# Patient Record
Sex: MALE | Race: WHITE | NOT HISPANIC OR LATINO | Employment: FULL TIME | URBAN - METROPOLITAN AREA
[De-identification: names, ages, dates, MRNs, and addresses within clinical notes are randomized per-mention and may not be internally consistent; named-entity substitution may affect disease eponyms.]

---

## 2018-09-24 ENCOUNTER — APPOINTMENT (OUTPATIENT)
Dept: URGENT CARE | Facility: CLINIC | Age: 28
End: 2018-09-24

## 2018-09-24 DIAGNOSIS — Z02.1 PRE-EMPLOYMENT HEALTH SCREENING EXAMINATION: Primary | ICD-10-CM

## 2018-09-24 PROCEDURE — 86787 VARICELLA-ZOSTER ANTIBODY: CPT | Performed by: PHYSICIAN ASSISTANT

## 2018-09-24 PROCEDURE — 86735 MUMPS ANTIBODY: CPT | Performed by: PHYSICIAN ASSISTANT

## 2018-09-24 PROCEDURE — 86765 RUBEOLA ANTIBODY: CPT | Performed by: PHYSICIAN ASSISTANT

## 2018-09-24 PROCEDURE — 86706 HEP B SURFACE ANTIBODY: CPT | Performed by: PHYSICIAN ASSISTANT

## 2018-09-24 PROCEDURE — 86480 TB TEST CELL IMMUN MEASURE: CPT | Performed by: PHYSICIAN ASSISTANT

## 2018-09-24 PROCEDURE — 86762 RUBELLA ANTIBODY: CPT | Performed by: PHYSICIAN ASSISTANT

## 2018-09-25 LAB
HBV SURFACE AB SER-ACNC: >1000 MIU/ML
MEV IGG SER QL: NORMAL
MUV IGG SER QL: NORMAL
VZV IGG SER IA-ACNC: NORMAL

## 2018-09-26 LAB
GAMMA INTERFERON BACKGROUND BLD IA-ACNC: 0.07 IU/ML
M TB IFN-G BLD-IMP: NEGATIVE
M TB IFN-G CD4+ BCKGRND COR BLD-ACNC: -0.03 IU/ML
M TB IFN-G CD4+ BCKGRND COR BLD-ACNC: 0.03 IU/ML
MITOGEN IGNF BCKGRD COR BLD-ACNC: >10 IU/ML

## 2018-09-28 LAB — RUBV IGG SERPL IA-ACNC: 123.3 IU/ML

## 2019-03-29 LAB
EXTERNAL HIV CONFIRMATION: NORMAL
EXTERNAL HIV SCREEN: NORMAL

## 2020-08-20 ENCOUNTER — OFFICE VISIT (OUTPATIENT)
Dept: FAMILY MEDICINE CLINIC | Facility: CLINIC | Age: 30
End: 2020-08-20
Payer: COMMERCIAL

## 2020-08-20 VITALS
OXYGEN SATURATION: 98 % | TEMPERATURE: 97.7 F | DIASTOLIC BLOOD PRESSURE: 66 MMHG | SYSTOLIC BLOOD PRESSURE: 110 MMHG | HEART RATE: 84 BPM | RESPIRATION RATE: 16 BRPM | BODY MASS INDEX: 27.78 KG/M2 | WEIGHT: 187.6 LBS | HEIGHT: 69 IN

## 2020-08-20 DIAGNOSIS — R10.9 ABDOMINAL DISCOMFORT: ICD-10-CM

## 2020-08-20 DIAGNOSIS — Z91.030 BEE ALLERGY STATUS: Primary | ICD-10-CM

## 2020-08-20 DIAGNOSIS — Z11.59 SPECIAL SCREENING EXAMINATION FOR UNSPECIFIED VIRAL DISEASE: ICD-10-CM

## 2020-08-20 PROCEDURE — 99204 OFFICE O/P NEW MOD 45 MIN: CPT | Performed by: NURSE PRACTITIONER

## 2020-08-20 PROCEDURE — 1036F TOBACCO NON-USER: CPT | Performed by: NURSE PRACTITIONER

## 2020-08-20 PROCEDURE — 3008F BODY MASS INDEX DOCD: CPT | Performed by: NURSE PRACTITIONER

## 2020-08-20 RX ORDER — DICYCLOMINE HYDROCHLORIDE 10 MG/1
10 CAPSULE ORAL
Qty: 30 CAPSULE | Refills: 0 | Status: SHIPPED | OUTPATIENT
Start: 2020-08-20 | End: 2020-09-10 | Stop reason: SDUPTHER

## 2020-08-20 RX ORDER — EPINEPHRINE 0.3 MG/.3ML
0.3 INJECTION SUBCUTANEOUS ONCE
Qty: 0.6 ML | Refills: 0 | Status: SHIPPED | OUTPATIENT
Start: 2020-08-20 | End: 2020-09-10 | Stop reason: SDUPTHER

## 2020-08-20 NOTE — ASSESSMENT & PLAN NOTE
Will obtain celiac disease panel and milk allergy panel  Advised to begin abdominal pain log to identify triggers  Discussed that symptoms sound consistent with IBS  Counseled on the fodmap diet  Advised to begin Bentyl 10 mg 3 times a day  Follow-up in 4 weeks or sooner if needed

## 2020-08-20 NOTE — PROGRESS NOTES
Assessment/Plan:    Bee allergy status  Provided EpiPen in the event a bee sting occurs  Abdominal discomfort  Will obtain celiac disease panel and milk allergy panel  Advised to begin abdominal pain log to identify triggers  Discussed that symptoms sound consistent with IBS  Counseled on the fodmap diet  Advised to begin Bentyl 10 mg 3 times a day  Follow-up in 4 weeks or sooner if needed  Diagnoses and all orders for this visit:    Bee allergy status  -     EPINEPHrine (EpiPen 2-Leighton) 0 3 mg/0 3 mL SOAJ; Inject 0 3 mL (0 3 mg total) into a muscle once for 1 dose    Abdominal discomfort  -     Allergy, Milk Components Panel; Future  -     Celiac Disease Panel; Future  -     dicyclomine (BENTYL) 10 mg capsule; Take 1 capsule (10 mg total) by mouth 3 (three) times a day before meals    BMI 27 0-27 9,adult  -     CBC and differential; Future  -     Comprehensive metabolic panel; Future  -     Lipid Panel with Direct LDL reflex; Future  -     TSH, 3rd generation with Free T4 reflex; Future    Special screening examination for unspecified viral disease  -     SARS-CoV2 Antibody, Total (IgG, IgA, IgM) [facility]; Future          Subjective:      Patient ID: Brando Coreas is a 26 y o  male  Rosalinda Sparks presents for an initial visit to establish care  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  He has a history an anaphylactic reaction of bee stings  Denies any additional significant past medical history  Denies any surgical history  Rosalinda Sparks reports developing left upper quadrant abdominal pain he describes as a burning  It has been presence the past 3 years  It is intermittent  He treated his symptoms with omeprazole without improvement  Having a bowel movement improves his symptoms  He will also experience loose stools several times a week  The main trigger is stress  He will experience abdominal cramping and his symptoms will improve after having a bowel movement  Denies painful swallowing, weight loss, nausea, vomiting, or blood in stools  He has no family history of inflammatory bowel disease  Requesting to be tested for COVID antibodies as his wife is currently pregnant and he worked in the hospital setting  The following portions of the patient's history were reviewed and updated as appropriate:   He   Patient Active Problem List    Diagnosis Date Noted    Bee allergy status 08/20/2020    Abdominal discomfort 08/20/2020    BMI 27 0-27 9,adult 08/20/2020    Special screening examination for unspecified viral disease 08/20/2020     Current Outpatient Medications   Medication Sig Dispense Refill    dicyclomine (BENTYL) 10 mg capsule Take 1 capsule (10 mg total) by mouth 3 (three) times a day before meals 30 capsule 0    EPINEPHrine (EpiPen 2-Leighton) 0 3 mg/0 3 mL SOAJ Inject 0 3 mL (0 3 mg total) into a muscle once for 1 dose 0 6 mL 0     No current facility-administered medications for this visit  He is allergic to bee venom       Review of Systems   Constitutional: Negative  HENT: Negative  Eyes: Negative  Respiratory: Negative  Cardiovascular: Negative  Gastrointestinal: Positive for abdominal pain (LUQ), constipation (occasional) and diarrhea  Negative for abdominal distention, anal bleeding, blood in stool, nausea and vomiting  Genitourinary: Negative  Musculoskeletal: Negative  Skin: Negative  Allergic/Immunologic: Negative  Neurological: Negative  Psychiatric/Behavioral: Negative  /66   Pulse 84   Temp 97 7 °F (36 5 °C) (Tympanic)   Resp 16   Ht 5' 9" (1 753 m)   Wt 85 1 kg (187 lb 9 6 oz)   SpO2 98%   BMI 27 70 kg/m²     Objective:     Physical Exam  Vitals signs and nursing note reviewed  Constitutional:       General: He is not in acute distress  Appearance: Normal appearance  He is well-developed  He is not ill-appearing, toxic-appearing or diaphoretic     HENT:      Head: Normocephalic and atraumatic  Eyes:      Conjunctiva/sclera: Conjunctivae normal    Neck:      Musculoskeletal: Neck supple  Cardiovascular:      Rate and Rhythm: Normal rate and regular rhythm  Heart sounds: Normal heart sounds  No murmur  Pulmonary:      Effort: Pulmonary effort is normal  No respiratory distress  Breath sounds: Normal breath sounds  No wheezing or rales  Chest:      Chest wall: No tenderness  Abdominal:      General: Abdomen is flat  Bowel sounds are normal  There is no distension  Palpations: Abdomen is soft  There is no mass  Tenderness: There is no abdominal tenderness  There is no guarding or rebound  Hernia: No hernia is present  Neurological:      Mental Status: He is alert and oriented to person, place, and time  Psychiatric:         Mood and Affect: Mood normal          Behavior: Behavior normal          Thought Content: Thought content normal          Judgment: Judgment normal          BMI Counseling: Body mass index is 27 7 kg/m²  The BMI is above normal  Nutrition recommendations include 3-5 servings of fruits/vegetables daily, reducing fast food intake, consuming healthier snacks, moderation in carbohydrate intake and increasing intake of lean protein  Exercise recommendations include exercising 3-5 times per week

## 2020-08-20 NOTE — PATIENT INSTRUCTIONS
Weight Management   AMBULATORY CARE:   Why it is important to manage your weight:  Being overweight increases your risk of health conditions such as heart disease, high blood pressure, type 2 diabetes, and certain types of cancer  It can also increase your risk for osteoarthritis, sleep apnea, and other respiratory problems  Aim for a slow, steady weight loss  Even a small amount of weight loss can lower your risk of health problems  How to lose weight safely:  A safe and healthy way to lose weight is to eat fewer calories and get regular exercise  You can lose up about 1 pound a week by decreasing the number of calories you eat by 500 calories each day  You can decrease calories by eating smaller portion sizes or by cutting out high-calorie foods  Read labels to find out how many calories are in the foods you eat  You can also burn calories with exercise such as walking, swimming, or biking  You will be more likely to keep weight off if you make these changes part of your lifestyle  Healthy meal plan for weight management:  A healthy meal plan includes a variety of foods, contains fewer calories, and helps you stay healthy  A healthy meal plan includes the following:  · Eat whole-grain foods more often  A healthy meal plan should contain fiber  Fiber is the part of grains, fruits, and vegetables that is not broken down by your body  Whole-grain foods are healthy and provide extra fiber in your diet  Some examples of whole-grain foods are whole-wheat breads and pastas, oatmeal, brown rice, and bulgur  · Eat a variety of vegetables every day  Include dark, leafy greens such as spinach, kale, yolande greens, and mustard greens  Eat yellow and orange vegetables such as carrots, sweet potatoes, and winter squash  · Eat a variety of fruits every day  Choose fresh or canned fruit (canned in its own juice or light syrup) instead of juice  Fruit juice has very little or no fiber  · Eat low-fat dairy foods  Drink fat-free (skim) milk or 1% milk  Eat fat-free yogurt and low-fat cottage cheese  Try low-fat cheeses such as mozzarella and other reduced-fat cheeses  · Choose meat and other protein foods that are low in fat  Choose beans or other legumes such as split peas or lentils  Choose fish, skinless poultry (chicken or turkey), or lean cuts of red meat (beef or pork)  Before you cook meat or poultry, cut off any visible fat  · Use less fat and oil  Try baking foods instead of frying them  Add less fat, such as margarine, sour cream, regular salad dressing and mayonnaise to foods  Eat fewer high-fat foods  Some examples of high-fat foods include french fries, doughnuts, ice cream, and cakes  · Eat fewer sweets  Limit foods and drinks that are high in sugar  This includes candy, cookies, regular soda, and sweetened drinks  Ways to decrease calories:   · Eat smaller portions  ¨ Use a small plate with smaller servings  ¨ Do not eat second helpings  ¨ When you eat at a restaurant, ask for a box and place half of your meal in the box before you eat  ¨ Share an entrée with someone else  · Replace high-calorie snacks with healthy, low-calorie snacks  ¨ Choose fresh fruit, vegetables, fat-free rice cakes, or air-popped popcorn instead of potato chips, nuts, or chocolate  ¨ Choose water or calorie-free drinks instead of soda or sweetened drinks  · Eat regular meals  Skipping meals can lead to overeating later in the day  Eat a healthy snack in place of a meal if you do not have time to eat a regular meal      · Do not shop for groceries when you are hungry  You may be more likely to make unhealthy food choices  Take a grocery list of healthy foods and shop after you have eaten  Exercise:  Exercise at least 30 minutes per day on most days of the week  Some examples of exercise include walking, biking, dancing, and swimming   You can also fit in more physical activity by taking the stairs instead of the elevator or parking farther away from stores  Ask your healthcare provider about the best exercise plan for you  Other things to consider as you try to lose weight:   · Be aware of situations that may give you the urge to overeat, such as eating while watching television  Find ways to avoid these situations  For example, read a book, go for a walk, or do crafts  · Meet with a weight loss support group or friends who are also trying to lose weight  This may help you stay motivated to continue working on your weight loss goals  © 2017 [employer] [employer] Information is for End User's use only and may not be sold, redistributed or otherwise used for commercial purposes  All illustrations and images included in CareNotes® are the copyrighted property of [employer]  or Alen Sosa  The above information is an  only  It is not intended as medical advice for individual conditions or treatments  Talk to your doctor, nurse or pharmacist before following any medical regimen to see if it is safe and effective for you  Heart Healthy Diet   AMBULATORY CARE:   A heart healthy diet  is an eating plan low in total fat, unhealthy fats, and sodium (salt)  A heart healthy diet helps decrease your risk for heart disease and stroke  Limit the amount of fat you eat to 25% to 35% of your total daily calories  Limit sodium to less than 2,300 mg each day  Healthy fats:  Healthy fats can help improve cholesterol levels  The risk for heart disease is decreased when cholesterol levels are normal  Choose healthy fats, such as the following:  · Unsaturated fat  is found in foods such as soybean, canola, olive, corn, and safflower oils  It is also found in soft tub margarine that is made with liquid vegetable oil  · Omega-3 fat  is found in certain fish, such as salmon, tuna, and trout, and in walnuts and flaxseed    Unhealthy fats:  Unhealthy fats can cause unhealthy cholesterol levels in your blood and increase your risk of heart disease  Limit unhealthy fats, such as the following:  · Cholesterol  is found in animal foods, such as eggs and lobster, and in dairy products made from whole milk  Limit cholesterol to less than 300 milligrams (mg) each day  You may need to limit cholesterol to 200 mg each day if you have heart disease  · Saturated fat  is found in meats, such as layne and hamburger  It is also found in chicken or turkey skin, whole milk, and butter  Limit saturated fat to less than 7% of your total daily calories  Limit saturated fat to less than 6% if you have heart disease or are at increased risk for it  · Trans fat  is found in packaged foods, such as potato chips and cookies  It is also in hard margarine, some fried foods, and shortening  Avoid trans fats as much as possible    Heart healthy foods and drinks to include:  Ask your dietitian or healthcare provider how many servings to have from each of the following food groups:  · Grains:      ¨ Whole-wheat breads, cereals, and pastas, and brown rice    ¨ Low-fat, low-sodium crackers and chips    · Vegetables:      ¨ Broccoli, green beans, green peas, and spinach    ¨ Collards, kale, and lima beans    ¨ Carrots, sweet potatoes, tomatoes, and peppers    ¨ Canned vegetables with no salt added    · Fruits:      ¨ Bananas, peaches, pears, and pineapple    ¨ Grapes, raisins, and dates    ¨ Oranges, tangerines, grapefruit, orange juice, and grapefruit juice    ¨ Apricots, mangoes, melons, and papaya    ¨ Raspberries and strawberries    ¨ Canned fruit with no added sugar    · Low-fat dairy products:      ¨ Nonfat (skim) milk, 1% milk, and low-fat almond, cashew, or soy milks fortified with calcium    ¨ Low-fat cheese, regular or frozen yogurt, and cottage cheese    · Meats and proteins , such as lean cuts of beef and pork (loin, leg, round), skinless chicken and turkey, legumes, soy products, egg whites, and nuts  Foods and drinks to limit or avoid:  Ask your dietitian or healthcare provider about these and other foods that are high in unhealthy fat, sodium, and sugar:  · Snack or packaged foods , such as frozen dinners, cookies, macaroni and cheese, and cereals with more than 300 mg of sodium per serving    · Canned or dry mixes  for cakes, soups, sauces, or gravies    · Vegetables with added sodium , such as instant potatoes, vegetables with added sauces, or regular canned vegetables    · Other foods high in sodium , such as ketchup, barbecue sauce, salad dressing, pickles, olives, soy sauce, and miso    · High-fat dairy foods  such as whole or 2% milk, cream cheese, or sour cream, and cheeses     · High-fat protein foods  such as high-fat cuts of beef (T-bone steaks, ribs), chicken or turkey with skin, and organ meats, such as liver    · Cured or smoked meats , such as hot dogs, layne, and sausage    · Unhealthy fats and oils , such as butter, stick margarine, shortening, and cooking oils such as coconut or palm oil    · Food and drinks high in sugar , such as soft drinks (soda), sports drinks, sweetened tea, candy, cake, cookies, pies, and doughnuts  Other diet guidelines to follow:   · Eat more foods containing omega-3 fats  Eat fish high in omega-3 fats at least 2 times a week  · Limit alcohol  Too much alcohol can damage your heart and raise your blood pressure  Women should limit alcohol to 1 drink a day  Men should limit alcohol to 2 drinks a day  A drink of alcohol is 12 ounces of beer, 5 ounces of wine, or 1½ ounces of liquor  · Choose low-sodium foods  High-sodium foods can lead to high blood pressure  Add little or no salt to food you prepare  Use herbs and spices in place of salt  · Eat more fiber  to help lower cholesterol levels  Eat at least 5 servings of fruits and vegetables each day  Eat 3 ounces of whole-grain foods each day  Legumes (beans) are also a good source of fiber    Lifestyle guidelines: · Do not smoke  Nicotine and other chemicals in cigarettes and cigars can cause lung and heart damage  Ask your healthcare provider for information if you currently smoke and need help to quit  E-cigarettes or smokeless tobacco still contain nicotine  Talk to your healthcare provider before you use these products  · Exercise regularly  to help you maintain a healthy weight and improve your blood pressure and cholesterol levels  Ask your healthcare provider about the best exercise plan for you  Do not start an exercise program without asking your healthcare provider  Follow up with your healthcare provider as directed:  Write down your questions so you remember to ask them during your visits  © 2017 [employer] Information is for End User's use only and may not be sold, redistributed or otherwise used for commercial purposes  All illustrations and images included in CareNotes® are the copyrighted property of [employer]  or [employer]osa  The above information is an  only  It is not intended as medical advice for individual conditions or treatments  Talk to your doctor, nurse or pharmacist before following any medical regimen to see if it is safe and effective for you  Calorie Counting Diet   WHAT YOU NEED TO KNOW:   What is a calorie counting diet? It is a meal plan based on counting calories each day to reach a healthy body weight  You will need to eat fewer calories if you are trying to lose weight  Weight loss may decrease your risk for certain health problems or improve your health if you have health problems  Some of these health problems include heart disease, high blood pressure, and diabetes  What foods should I avoid? Your dietitian will tell you if you need to avoid certain foods based on your body weight and health condition  You may need to avoid high-fat foods if you are at risk for or have heart disease   You may need to eat fewer foods from the breads and starches food group if you have diabetes  How many calories are in foods? The following is a list of foods and drinks with the approximate number of calories in each  Check the food label to find the exact number of calories  A dietitian can tell you how many calories you should have from each food group each day    · Carbohydrate:      ¨ ½ of a 3-inch bagel, 1 slice of bread, or ½ of a hamburger bun or hot dog bun (80)    ¨ 1 (8-inch) flour tortilla or ½ cup of cooked rice (100)    ¨ 1 (6-inch) corn tortilla (80)    ¨ 1 (6-inch) pancake or 1 cup of bran flakes cereal (110)    ¨ ½ cup of cooked cereal (80)    ¨ ½ cup of cooked pasta (85)    ¨ 1 ounce of pretzels (100)    ¨ 3 cups of air-popped popcorn without butter or oil (80)    · Dairy:      ¨ 1 cup of skim or 1% milk (90)    ¨ 1 cup of 2% milk (120)    ¨ 1 cup of whole milk (160)    ¨ 1 cup of 2% chocolate milk (220)    ¨ 1 ounce of low-fat cheese with 3 grams of fat per ounce (70)    ¨ 1 ounce of cheddar cheese (114)    ¨ ½ cup of 1% fat cottage cheese (80)    ¨ 1 cup of plain or sugar-free, fat-free yogurt (90)    · Protein foods:      ¨ 3 ounces of fish (not breaded or fried) (95)    ¨ 3 ounces of breaded, fried fish (195)    ¨ ¾ cup of tuna canned in water (105)    ¨ 3 ounces of chicken breast without skin (105)    ¨ 1 fried chicken breast with skin (350)    ¨ ¼ cup of fat free egg substitute (40)    ¨ 1 large egg (75)    ¨ 3 ounces of lean beef or pork (165)    ¨ 3 ounces of fried pork chop or ham (185)    ¨ ½ cup of cooked dried beans, such as kidney, maher, lentils, or navy (115)    ¨ 3 ounces of bologna or lunch meat (225)    ¨ 2 links of breakfast sausage (140)    · Vegetables:      ¨ ½ cup of sliced mushrooms (10)    ¨ 1 cup of salad greens, such as lettuce, spinach, or cathryn (15)    ¨ ½ cup of steamed asparagus (20)    ¨ ½ cup of cooked summer squash, zucchini squash, or green or wax beans (25)    ¨ 1 cup of broccoli or cauliflower florets, or 1 medium tomato (25)    ¨ 1 large raw carrot or ½ cup of cooked carrots (40)    ¨ ? of a medium cucumber or 1 stalk of celery (5)    ¨ 1 small baked potato (160)    ¨ 1 cup of breaded, fried vegetables (230)    · Fruit:      ¨ 1 (6-inch) banana (55)     ¨ ½ of a 4-inch grapefruit (55)    ¨ 15 grapes (60)    ¨ 1 medium orange or apple (70)    ¨ 1 large peach (65)    ¨ 1 cup of fresh pineapple chunks (75)    ¨ 1 cup of melon cubes (50)    ¨ 1¼ cups of whole strawberries (45)    ¨ ½ cup of fruit canned in juice (55)    ¨ ½ cup of fruit canned in heavy syrup (110)    ¨ ?  cup of raisins (130)    ¨ ½ cup of unsweetened fruit juice (60)    ¨ ½ cup of grape, cranberry, or prune juice (90)    · Fat:      ¨ 10 peanuts or 2 teaspoons of peanut butter (55)    ¨ 2 tablespoons of avocado or 1 tablespoon of regular salad dressing (45)    ¨ 2 slices of layne (90)    ¨ 1 teaspoon of oil, such as safflower, canola, corn, or olive oil (45)    ¨ 2 teaspoons of low-fat margarine, or 1 tablespoon of low-fat mayonnaise (50)    ¨ 1 teaspoon of regular margarine (40)    ¨ 1 tablespoon of regular mayonnaise (135)    ¨ 1 tablespoon of cream cheese or 2 tablespoons of low-fat cream cheese (45)    ¨ 2 tablespoons of vegetable shortening (215)    · Dessert and sweets:      ¨ 8 animal crackers or 5 vanilla wafers (80)    ¨ 1 frozen fruit juice bar (80)    ¨ ½ cup of ice milk or low-fat frozen yogurt (90)    ¨ ½ cup of sherbet or sorbet (125)    ¨ ½ cup of sugar-free pudding or custard (60)    ¨ ½ cup of ice cream (140)    ¨ ½ cup of pudding or custard (175)    ¨ 1 (2-inch) square chocolate brownie (185)    · Combination foods:      ¨ Bean burrito made with an 8-inch tortilla, without cheese (275)    ¨ Chicken breast sandwich with lettuce and tomato (325)    ¨ 1 cup of chicken noodle soup (60)    ¨ 1 beef taco (175)    ¨ Regular hamburger with lettuce and tomato (310)    ¨ Regular cheeseburger with lettuce and tomato (410)     ¨ ¼ of a 12-inch cheese pizza (280)    ¨ Fried fish sandwich with lettuce and tomato (425)    ¨ Hot dog and bun (275)    ¨ 1½ cups of macaroni and cheese (310)    ¨ Taco salad with a fried tortilla shell (870)    · Low-calorie foods:      ¨ 1 tablespoon of ketchup or 1 tablespoon of fat free sour cream (15)    ¨ 1 teaspoon of mustard (5)    ¨ ¼ cup of salsa (20)    ¨ 1 large dill pickle (15)    ¨ 1 tablespoon of fat free salad dressing (10)    ¨ 2 teaspoons of low-sugar, light jam or jelly, or 1 tablespoon of sugar-free syrup (15)    ¨ 1 sugar-free popsicle (15)    ¨ 1 cup of club soda, seltzer water, or diet soda (0)  CARE AGREEMENT:   You have the right to help plan your care  Discuss treatment options with your caregivers to decide what care you want to receive  You always have the right to refuse treatment  The above information is an  only  It is not intended as medical advice for individual conditions or treatments  Talk to your doctor, nurse or pharmacist before following any medical regimen to see if it is safe and effective for you  © 2017 [employer] [employer] Information is for End User's use only and may not be sold, redistributed or otherwise used for commercial purposes  All illustrations and images included in CareNotes® are the copyrighted property of [employer]  or [employer]

## 2020-08-24 ENCOUNTER — APPOINTMENT (OUTPATIENT)
Dept: LAB | Facility: HOSPITAL | Age: 30
End: 2020-08-24
Payer: COMMERCIAL

## 2020-08-24 DIAGNOSIS — Z11.59 SPECIAL SCREENING EXAMINATION FOR UNSPECIFIED VIRAL DISEASE: ICD-10-CM

## 2020-08-24 DIAGNOSIS — R10.9 ABDOMINAL DISCOMFORT: ICD-10-CM

## 2020-08-24 LAB
ALBUMIN SERPL BCP-MCNC: 4.1 G/DL (ref 3.5–5)
ALP SERPL-CCNC: 53 U/L (ref 46–116)
ALT SERPL W P-5'-P-CCNC: 55 U/L (ref 12–78)
ANION GAP SERPL CALCULATED.3IONS-SCNC: 6 MMOL/L (ref 4–13)
AST SERPL W P-5'-P-CCNC: 30 U/L (ref 5–45)
BASOPHILS # BLD AUTO: 0.03 THOUSANDS/ΜL (ref 0–0.1)
BASOPHILS NFR BLD AUTO: 1 % (ref 0–1)
BILIRUB SERPL-MCNC: 1.1 MG/DL (ref 0.2–1)
BUN SERPL-MCNC: 19 MG/DL (ref 5–25)
CALCIUM SERPL-MCNC: 9.2 MG/DL (ref 8.3–10.1)
CHLORIDE SERPL-SCNC: 103 MMOL/L (ref 100–108)
CHOLEST SERPL-MCNC: 253 MG/DL (ref 50–200)
CO2 SERPL-SCNC: 31 MMOL/L (ref 21–32)
CREAT SERPL-MCNC: 1.04 MG/DL (ref 0.6–1.3)
EOSINOPHIL # BLD AUTO: 0.07 THOUSAND/ΜL (ref 0–0.61)
EOSINOPHIL NFR BLD AUTO: 2 % (ref 0–6)
ERYTHROCYTE [DISTWIDTH] IN BLOOD BY AUTOMATED COUNT: 11.7 % (ref 11.6–15.1)
GFR SERPL CREATININE-BSD FRML MDRD: 95 ML/MIN/1.73SQ M
GLUCOSE P FAST SERPL-MCNC: 98 MG/DL (ref 65–99)
HCT VFR BLD AUTO: 46.1 % (ref 36.5–49.3)
HDLC SERPL-MCNC: 61 MG/DL
HGB BLD-MCNC: 15.5 G/DL (ref 12–17)
IMM GRANULOCYTES # BLD AUTO: 0.01 THOUSAND/UL (ref 0–0.2)
IMM GRANULOCYTES NFR BLD AUTO: 0 % (ref 0–2)
LDLC SERPL CALC-MCNC: 175 MG/DL (ref 0–100)
LYMPHOCYTES # BLD AUTO: 1.41 THOUSANDS/ΜL (ref 0.6–4.47)
LYMPHOCYTES NFR BLD AUTO: 34 % (ref 14–44)
MCH RBC QN AUTO: 30.6 PG (ref 26.8–34.3)
MCHC RBC AUTO-ENTMCNC: 33.6 G/DL (ref 31.4–37.4)
MCV RBC AUTO: 91 FL (ref 82–98)
MONOCYTES # BLD AUTO: 0.28 THOUSAND/ΜL (ref 0.17–1.22)
MONOCYTES NFR BLD AUTO: 7 % (ref 4–12)
NEUTROPHILS # BLD AUTO: 2.37 THOUSANDS/ΜL (ref 1.85–7.62)
NEUTS SEG NFR BLD AUTO: 56 % (ref 43–75)
NRBC BLD AUTO-RTO: 0 /100 WBCS
PLATELET # BLD AUTO: 241 THOUSANDS/UL (ref 149–390)
PMV BLD AUTO: 10.8 FL (ref 8.9–12.7)
POTASSIUM SERPL-SCNC: 4.4 MMOL/L (ref 3.5–5.3)
PROT SERPL-MCNC: 8.1 G/DL (ref 6.4–8.2)
RBC # BLD AUTO: 5.06 MILLION/UL (ref 3.88–5.62)
SARS-COV-2 IGG+IGM SERPL QL IA: NORMAL
SODIUM SERPL-SCNC: 140 MMOL/L (ref 136–145)
TRIGL SERPL-MCNC: 86 MG/DL
TSH SERPL DL<=0.05 MIU/L-ACNC: 2.46 UIU/ML (ref 0.36–3.74)
WBC # BLD AUTO: 4.17 THOUSAND/UL (ref 4.31–10.16)

## 2020-08-24 PROCEDURE — 82784 ASSAY IGA/IGD/IGG/IGM EACH: CPT

## 2020-08-24 PROCEDURE — 86255 FLUORESCENT ANTIBODY SCREEN: CPT

## 2020-08-24 PROCEDURE — 85025 COMPLETE CBC W/AUTO DIFF WBC: CPT

## 2020-08-24 PROCEDURE — 84443 ASSAY THYROID STIM HORMONE: CPT

## 2020-08-24 PROCEDURE — 83516 IMMUNOASSAY NONANTIBODY: CPT

## 2020-08-24 PROCEDURE — 80061 LIPID PANEL: CPT

## 2020-08-24 PROCEDURE — 36415 COLL VENOUS BLD VENIPUNCTURE: CPT

## 2020-08-24 PROCEDURE — 86769 SARS-COV-2 COVID-19 ANTIBODY: CPT

## 2020-08-24 PROCEDURE — 86008 ALLG SPEC IGE RECOMB EA: CPT

## 2020-08-24 PROCEDURE — 80053 COMPREHEN METABOLIC PANEL: CPT

## 2020-08-25 LAB
A-LACTALB IGE QN: <0.1 KAU/I
B-LACTOGLOB IGE QN: <0.1 KAU/I
CASEIN IGE QN: <0.1 KAU/I
ENDOMYSIUM IGA SER QL: NEGATIVE
GLIADIN PEPTIDE IGA SER-ACNC: 11 UNITS (ref 0–19)
GLIADIN PEPTIDE IGG SER-ACNC: 47 UNITS (ref 0–19)
IGA SERPL-MCNC: 245 MG/DL (ref 90–386)
TTG IGA SER-ACNC: 2 U/ML (ref 0–3)
TTG IGG SER-ACNC: 6 U/ML (ref 0–5)

## 2020-08-27 ENCOUNTER — TELEPHONE (OUTPATIENT)
Dept: FAMILY MEDICINE CLINIC | Facility: CLINIC | Age: 30
End: 2020-08-27

## 2020-08-27 DIAGNOSIS — R89.4 ABNORMAL CELIAC ANTIBODY PANEL: ICD-10-CM

## 2020-08-27 DIAGNOSIS — R10.9 ABDOMINAL DISCOMFORT: Primary | ICD-10-CM

## 2020-08-27 NOTE — TELEPHONE ENCOUNTER
----- Message from Feli Franko, Ramya Nitza Johnson sent at 8/27/2020 11:22 AM EDT -----  Please call patient to review labs and to make aware that his cholesterol was elevated  Please  on daily physical activity, beginning a heart healthy diet, avoidance of fried/process food, and increasing dietary fiber  We will repeat his cholesterol in 3-6 months  His COVID antibody testing was negative, no immunity  His celiac antibody panel was weakly positive  Given his symptoms, I would like to refer him to GI for further evaluation  I will place an order in his chart

## 2020-09-10 DIAGNOSIS — R10.9 ABDOMINAL DISCOMFORT: ICD-10-CM

## 2020-09-10 DIAGNOSIS — Z91.030 BEE ALLERGY STATUS: ICD-10-CM

## 2020-09-10 PROBLEM — E78.5 HYPERLIPIDEMIA: Status: ACTIVE | Noted: 2020-09-10

## 2020-09-10 RX ORDER — EPINEPHRINE 0.3 MG/.3ML
0.3 INJECTION SUBCUTANEOUS ONCE
Qty: 0.6 ML | Refills: 0 | Status: SHIPPED | OUTPATIENT
Start: 2020-09-10 | End: 2020-09-10

## 2020-09-10 RX ORDER — DICYCLOMINE HYDROCHLORIDE 10 MG/1
10 CAPSULE ORAL
Qty: 30 CAPSULE | Refills: 0 | Status: SHIPPED | OUTPATIENT
Start: 2020-09-10 | End: 2021-11-15 | Stop reason: ALTCHOICE

## 2020-09-15 ENCOUNTER — CONSULT (OUTPATIENT)
Dept: GASTROENTEROLOGY | Facility: CLINIC | Age: 30
End: 2020-09-15
Payer: COMMERCIAL

## 2020-09-15 VITALS
BODY MASS INDEX: 27.99 KG/M2 | SYSTOLIC BLOOD PRESSURE: 120 MMHG | HEIGHT: 69 IN | WEIGHT: 189 LBS | RESPIRATION RATE: 16 BRPM | DIASTOLIC BLOOD PRESSURE: 80 MMHG | HEART RATE: 100 BPM

## 2020-09-15 DIAGNOSIS — R14.0 BLOATING: ICD-10-CM

## 2020-09-15 DIAGNOSIS — R10.12 LEFT UPPER QUADRANT ABDOMINAL PAIN: ICD-10-CM

## 2020-09-15 DIAGNOSIS — R10.9 ABDOMINAL DISCOMFORT: ICD-10-CM

## 2020-09-15 DIAGNOSIS — R89.4 ABNORMAL CELIAC ANTIBODY PANEL: ICD-10-CM

## 2020-09-15 DIAGNOSIS — R17 ELEVATED BILIRUBIN: Primary | ICD-10-CM

## 2020-09-15 PROCEDURE — 99244 OFF/OP CNSLTJ NEW/EST MOD 40: CPT | Performed by: INTERNAL MEDICINE

## 2020-09-15 NOTE — PROGRESS NOTES
[facility]    Dear Nelda Carlson    I had the pleasure of seeing your patient Steven Romero in the office today and I thank you for this kind referral        Chief Complaint:  Abdominal discomfort      HPI:  Steven Romero is a 26 y o  male who presents with fairly long history of abdominal bloating discomfort  According to the patient has been a history of left upper quadrant discomfort and bloating for many months  He has no nausea or vomiting  He has had no weight loss  In fact he has had some mild weight gain  He denies any dysphagia or odynophagia  He has no melena  Patient underwent celiac testing and was found to elevated tTG IgG and anti-gliadin antibody, but the tTG IgA was normal   Patient also was found to have an elevated total bilirubin of 1 1  His other liver functions were normal   Patient drinks beer but does not over indulge in alcohol  He has no prior history of any known liver disease  He has no symptomatology referable to the liver  Patient has tried altering some his carbohydrate bread intake particularly, but he has not tried to maintain a free diet  He feels that since he has cut on bread and some grains he has had some very mild improvement  He has no nocturnal symptomatology  No exertional symptomatology  There is no positional component to his left upper quadrant discomfort  Does not appear to be directly related to meals  It does not radiate  No other definitive exacerbating or remitting factor for this         Review of Systems:   Constitutional: No fever or chills, feels well, no tiredness, no recent weight gain or weight loss  HENT: No complaints of earache, no hearing loss, no nosebleeds, no nasal discharge, no sore throat, no hoarseness  Eyes: No complaints of eye pain, no red eyes, no discharge from eyes, no itchy eyes    Cardiovascular: No complaints of slow heart rate, no fast heart rate, no chest pain, no palpitations, no leg claudication, no lower extremity edema  Respiratory: No complaints of shortness of breath, no wheezing, no cough, no SOB on exertion, no orthopnea  Gastrointestinal: As noted in HPI  Genitourinary: No complaints of dysuria, no incontinence, no hesitancy, no nocturia  Musculoskeletal: No complaints of arthralgia, no myalgias, no joint swelling or stiffness, no limb pain or swelling  Neurological: No complaints of headache, no confusion, no convulsions, no numbness or tingling, no dizziness or fainting, no limb weakness, no difficulty walking  Skin: No complaints of skin rash or skin lesions, no itching, no skin wound, no dry skin  Hematological/Lymphatic: No complaints of swollen glands, does not bleed easy  Allergic/Immunologic: No immunocompromised state  Endocrine:  No complaints of polyuria, no polydipsia  Psychiatric/Behavioral: is not suicidal, no sleep disturbances, no anxiety or depression, no change in personality, no emotional problems  Historical Information   History reviewed  No pertinent past medical history  History reviewed  No pertinent surgical history    Social History   Social History     Substance and Sexual Activity   Alcohol Use Yes    Frequency: Monthly or less     Social History     Substance and Sexual Activity   Drug Use Never     Social History     Tobacco Use   Smoking Status Never Smoker   Smokeless Tobacco Never Used     Family History   Problem Relation Age of Onset    Lung cancer Mother     Coronary artery disease Mother     Hypertension Mother     Hyperlipidemia Mother     Hypertension Father     Hyperlipidemia Father     Hypertension Maternal Grandmother     Hyperlipidemia Maternal Grandmother     Heart attack Maternal Grandmother     Hypertension Maternal Grandfather     Hyperlipidemia Maternal Grandfather     Hypertension Paternal Grandmother     Hyperlipidemia Paternal Grandmother     Colon cancer Cousin          Current Medications: has a current medication list which includes the following prescription(s): dicyclomine and epinephrine  Vital Signs: /80   Pulse 100   Resp 16   Ht 5' 9" (1 753 m)   Wt 85 7 kg (189 lb)   BMI 27 91 kg/m²     Physical Exam:   Constitutional  General Appearance: No acute distress, well appearing and well nourished  Head  Normocephalic  Eyes  Conjunctivae and lids: No swelling, erythema, or discharge  Pupils and irises: Equal, round and reactive to light  Ears, Nose, Mouth, and Throat  External inspection of ears and nose: Normal  Nasal mucosa, septum and turbinates: Normal without edema or erythema/   Oropharynx: Normal with no erythema, edema, exudate or lesions  Neck  Normal range of motion  Neck supple  Cardiovascular  Auscultation of the heart: Normal rate and rhythm, normal S1 and S2 without murmurs  Examination of the extremities for edema and/or varicosities: Normal  Pulmonary/Chest  Respiratory effort: No increased work of breathing or signs of respiratory distress  Auscultation of lungs: Clear to auscultation, equal breath sounds bilaterally, no wheezes, rales, no rhonchi  Abdomen  Abdomen: Non-tender, no masses  Liver and spleen: No hepatomegaly or splenomegaly  Musculoskeletal  Gait and station: normal   Digits and Nails: normal without clubbing or cyanosis  Inspection/palpation of joints, bones, and muscles: Normal  Neurological  No nystagmus or asterixis  Skin  Skin and subcutaneous tissue: Normal without rashes or lesions  Lymphatic  Palpation of the lymph nodes in neck: No lymphadenopathy     Psychiatric  Orientation to person, place and time: Normal   Mood and affect: Normal          Labs:   Lab Results   Component Value Date    ALT 55 08/24/2020    AST 30 08/24/2020    BUN 19 08/24/2020    CALCIUM 9 2 08/24/2020     08/24/2020    CHOL 221 11/19/2014    CO2 31 08/24/2020    CREATININE 1 04 08/24/2020    HDL 61 08/24/2020    HCT 46 1 08/24/2020    HGB 15 5 08/24/2020  08/24/2020    K 4 4 08/24/2020     11/19/2014    TRIG 86 08/24/2020    WBC 4 17 (L) 08/24/2020         X-Rays & Procedures:   No orders to display         ______________________________________________________________________      Assessment & Plan:      Diagnoses and all orders for this visit:    Elevated bilirubin  -     Gamma GT; Future  -     Bilirubin, direct; Future    Abdominal discomfort  -     Ambulatory referral to Gastroenterology    Abnormal celiac antibody panel  -     Ambulatory referral to Gastroenterology    Bloating    Left upper quadrant abdominal pain        The antibody pattern does not suggest overt celiac disease, but is left upper quadrant discomfort and bloating would warrant an EGD and we will biopsy the distal duodenum at the same time to test for this  I am going to order a direct bilirubin and GGT in order to determine whether this is just an inborn error of metabolism or anything that needs to be further worked up  I will be happy to inform you of his results and any further recommendations  I did give him a copy of a gluten free diet to consider since he may gluten sensitivity, according to the dietary changes he has already made instead of actual enteropathy  I would Like to thank you for allowing me to participate in his care            With warmest regards,    Carolina Agustin MD, [facility]

## 2020-09-15 NOTE — LETTER
September 15, 2020     Viola Gonzalez, [address] [place]  [address] [id number]    Patient: Elsa Hernandez   YOB: 1990   Date of Visit: 9/15/2020       Dear Dr Claude Damme: Thank you for referring Elsa Hernandez to me for evaluation  Below are my notes for this consultation  If you have questions, please do not hesitate to call me  I look forward to following your patient along with you  Sincerely,        Clari Dominguez MD        CC: No Recipients  Clari Dominguez MD  9/15/2020  4:02 PM  Incomplete  [facility]    Dear Lanny Barnett    I had the pleasure of seeing your patient Elsa Hernandez in the office today and I thank you for this kind referral        Chief Complaint:  Abdominal discomfort      HPI:  Elsa Hernandez is a 26 y o  male who presents with fairly long history of abdominal bloating discomfort  According to the patient has been a history of left upper quadrant discomfort and bloating for many months  He has no nausea or vomiting  He has had no weight loss  In fact he has had some mild weight gain  He denies any dysphagia or odynophagia  He has no melena  Patient underwent celiac testing and was found to elevated tTG IgG and anti-gliadin antibody, but the tTG IgA was normal   Patient also was found to have an elevated total bilirubin of 1 1  His other liver functions were normal   Patient drinks beer but does not over indulge in alcohol  He has no prior history of any known liver disease  He has no symptomatology referable to the liver  Patient has tried altering some his carbohydrate bread intake particularly, but he has not tried to maintain a free diet  He feels that since he has cut on bread and some grains he has had some very mild improvement  He has no nocturnal symptomatology  No exertional symptomatology  There is no positional component to his left upper quadrant discomfort    Does not appear to be directly related to meals  It does not radiate  No other definitive exacerbating or remitting factor for this         Review of Systems:   Constitutional: No fever or chills, feels well, no tiredness, no recent weight gain or weight loss  HENT: No complaints of earache, no hearing loss, no nosebleeds, no nasal discharge, no sore throat, no hoarseness  Eyes: No complaints of eye pain, no red eyes, no discharge from eyes, no itchy eyes  Cardiovascular: No complaints of slow heart rate, no fast heart rate, no chest pain, no palpitations, no leg claudication, no lower extremity edema  Respiratory: No complaints of shortness of breath, no wheezing, no cough, no SOB on exertion, no orthopnea  Gastrointestinal: As noted in HPI  Genitourinary: No complaints of dysuria, no incontinence, no hesitancy, no nocturia  Musculoskeletal: No complaints of arthralgia, no myalgias, no joint swelling or stiffness, no limb pain or swelling  Neurological: No complaints of headache, no confusion, no convulsions, no numbness or tingling, no dizziness or fainting, no limb weakness, no difficulty walking  Skin: No complaints of skin rash or skin lesions, no itching, no skin wound, no dry skin  Hematological/Lymphatic: No complaints of swollen glands, does not bleed easy  Allergic/Immunologic: No immunocompromised state  Endocrine:  No complaints of polyuria, no polydipsia  Psychiatric/Behavioral: is not suicidal, no sleep disturbances, no anxiety or depression, no change in personality, no emotional problems  Historical Information   History reviewed  No pertinent past medical history  History reviewed  No pertinent surgical history    Social History   Social History     Substance and Sexual Activity   Alcohol Use Yes    Frequency: Monthly or less     Social History     Substance and Sexual Activity   Drug Use Never     Social History     Tobacco Use   Smoking Status Never Smoker   Smokeless Tobacco Never Used     Family History Problem Relation Age of Onset    Lung cancer Mother     Coronary artery disease Mother     Hypertension Mother     Hyperlipidemia Mother     Hypertension Father     Hyperlipidemia Father     Hypertension Maternal Grandmother     Hyperlipidemia Maternal Grandmother     Heart attack Maternal Grandmother     Hypertension Maternal Grandfather     Hyperlipidemia Maternal Grandfather     Hypertension Paternal Grandmother     Hyperlipidemia Paternal Grandmother     Colon cancer Cousin          Current Medications: has a current medication list which includes the following prescription(s): dicyclomine and epinephrine  Vital Signs: /80   Pulse 100   Resp 16   Ht 5' 9" (1 753 m)   Wt 85 7 kg (189 lb)   BMI 27 91 kg/m²     Physical Exam:   Constitutional  General Appearance: No acute distress, well appearing and well nourished  Head  Normocephalic  Eyes  Conjunctivae and lids: No swelling, erythema, or discharge  Pupils and irises: Equal, round and reactive to light  Ears, Nose, Mouth, and Throat  External inspection of ears and nose: Normal  Nasal mucosa, septum and turbinates: Normal without edema or erythema/   Oropharynx: Normal with no erythema, edema, exudate or lesions  Neck  Normal range of motion  Neck supple  Cardiovascular  Auscultation of the heart: Normal rate and rhythm, normal S1 and S2 without murmurs  Examination of the extremities for edema and/or varicosities: Normal  Pulmonary/Chest  Respiratory effort: No increased work of breathing or signs of respiratory distress  Auscultation of lungs: Clear to auscultation, equal breath sounds bilaterally, no wheezes, rales, no rhonchi  Abdomen  Abdomen: Non-tender, no masses  Liver and spleen: No hepatomegaly or splenomegaly  Musculoskeletal  Gait and station: normal   Digits and Nails: normal without clubbing or cyanosis    Inspection/palpation of joints, bones, and muscles: Normal  Neurological  No nystagmus or asterixis  Skin  Skin and subcutaneous tissue: Normal without rashes or lesions  Lymphatic  Palpation of the lymph nodes in neck: No lymphadenopathy  Psychiatric  Orientation to person, place and time: Normal   Mood and affect: Normal          Labs:   Lab Results   Component Value Date    ALT 55 08/24/2020    AST 30 08/24/2020    BUN 19 08/24/2020    CALCIUM 9 2 08/24/2020     08/24/2020    CHOL 221 11/19/2014    CO2 31 08/24/2020    CREATININE 1 04 08/24/2020    HDL 61 08/24/2020    HCT 46 1 08/24/2020    HGB 15 5 08/24/2020     08/24/2020    K 4 4 08/24/2020     11/19/2014    TRIG 86 08/24/2020    WBC 4 17 (L) 08/24/2020         X-Rays & Procedures:   No orders to display         ______________________________________________________________________      Assessment & Plan:      Diagnoses and all orders for this visit:    Elevated bilirubin  -     Gamma GT; Future  -     Bilirubin, direct; Future    Abdominal discomfort  -     Ambulatory referral to Gastroenterology    Abnormal celiac antibody panel  -     Ambulatory referral to Gastroenterology    Bloating    Left upper quadrant abdominal pain        The antibody pattern does not suggest overt celiac disease, but is left upper quadrant discomfort and bloating would warrant an EGD and we will biopsy the distal duodenum at the same time to test for this  I am going to order a direct bilirubin and GGT in order to determine whether this is just an inborn error of metabolism or anything that needs to be further worked up  I will be happy to inform you of his results and any further recommendations  I did give him a copy of a gluten free diet to consider since he may gluten sensitivity, according to the dietary changes he has already made instead of actual enteropathy  I would Like to thank you for allowing me to participate in his care            With warmest regards,    Vivian Lakhani MD, [facility]

## 2020-09-16 ENCOUNTER — APPOINTMENT (OUTPATIENT)
Dept: LAB | Facility: HOSPITAL | Age: 30
End: 2020-09-16
Attending: INTERNAL MEDICINE
Payer: COMMERCIAL

## 2020-09-16 DIAGNOSIS — R17 ELEVATED BILIRUBIN: ICD-10-CM

## 2020-09-16 LAB
BILIRUB DIRECT SERPL-MCNC: 0.2 MG/DL (ref 0–0.2)
GGT SERPL-CCNC: 37 U/L (ref 5–85)

## 2020-09-16 PROCEDURE — 82977 ASSAY OF GGT: CPT

## 2020-09-16 PROCEDURE — 82248 BILIRUBIN DIRECT: CPT

## 2020-09-16 PROCEDURE — 36415 COLL VENOUS BLD VENIPUNCTURE: CPT

## 2020-09-23 ENCOUNTER — TELEPHONE (OUTPATIENT)
Dept: GASTROENTEROLOGY | Facility: CLINIC | Age: 30
End: 2020-09-23

## 2020-09-30 ENCOUNTER — ANESTHESIA EVENT (OUTPATIENT)
Dept: GASTROENTEROLOGY | Facility: HOSPITAL | Age: 30
End: 2020-09-30

## 2020-09-30 ENCOUNTER — HOSPITAL ENCOUNTER (OUTPATIENT)
Dept: GASTROENTEROLOGY | Facility: HOSPITAL | Age: 30
Setting detail: OUTPATIENT SURGERY
Discharge: HOME | End: 2020-09-30
Attending: INTERNAL MEDICINE | Admitting: INTERNAL MEDICINE
Payer: COMMERCIAL

## 2020-09-30 ENCOUNTER — ANESTHESIA (OUTPATIENT)
Dept: GASTROENTEROLOGY | Facility: HOSPITAL | Age: 30
End: 2020-09-30

## 2020-09-30 VITALS
SYSTOLIC BLOOD PRESSURE: 114 MMHG | BODY MASS INDEX: 28.88 KG/M2 | TEMPERATURE: 97.6 F | DIASTOLIC BLOOD PRESSURE: 71 MMHG | WEIGHT: 195 LBS | HEIGHT: 69 IN | OXYGEN SATURATION: 96 % | HEART RATE: 72 BPM | RESPIRATION RATE: 17 BRPM

## 2020-09-30 VITALS — HEART RATE: 77 BPM

## 2020-09-30 DIAGNOSIS — R14.0 BLOATING: ICD-10-CM

## 2020-09-30 DIAGNOSIS — R89.4 ABNORMAL CELIAC ANTIBODY PANEL: ICD-10-CM

## 2020-09-30 DIAGNOSIS — R10.9 ABDOMINAL DISCOMFORT: ICD-10-CM

## 2020-09-30 DIAGNOSIS — R10.12 LEFT UPPER QUADRANT ABDOMINAL PAIN: ICD-10-CM

## 2020-09-30 DIAGNOSIS — R17 ELEVATED BILIRUBIN: ICD-10-CM

## 2020-09-30 PROCEDURE — 88305 TISSUE EXAM BY PATHOLOGIST: CPT | Performed by: PATHOLOGY

## 2020-09-30 PROCEDURE — 43239 EGD BIOPSY SINGLE/MULTIPLE: CPT | Performed by: INTERNAL MEDICINE

## 2020-09-30 RX ORDER — SODIUM CHLORIDE, SODIUM LACTATE, POTASSIUM CHLORIDE, CALCIUM CHLORIDE 600; 310; 30; 20 MG/100ML; MG/100ML; MG/100ML; MG/100ML
INJECTION, SOLUTION INTRAVENOUS CONTINUOUS PRN
Status: DISCONTINUED | OUTPATIENT
Start: 2020-09-30 | End: 2020-09-30

## 2020-09-30 RX ORDER — PROPOFOL 10 MG/ML
INJECTION, EMULSION INTRAVENOUS CONTINUOUS PRN
Status: DISCONTINUED | OUTPATIENT
Start: 2020-09-30 | End: 2020-09-30

## 2020-09-30 RX ORDER — LIDOCAINE HYDROCHLORIDE 10 MG/ML
INJECTION, SOLUTION EPIDURAL; INFILTRATION; INTRACAUDAL; PERINEURAL AS NEEDED
Status: DISCONTINUED | OUTPATIENT
Start: 2020-09-30 | End: 2020-09-30

## 2020-09-30 RX ORDER — PROPOFOL 10 MG/ML
INJECTION, EMULSION INTRAVENOUS AS NEEDED
Status: DISCONTINUED | OUTPATIENT
Start: 2020-09-30 | End: 2020-09-30

## 2020-09-30 RX ADMIN — LIDOCAINE HYDROCHLORIDE 50 MG: 10 INJECTION, SOLUTION EPIDURAL; INFILTRATION; INTRACAUDAL; PERINEURAL at 07:18

## 2020-09-30 RX ADMIN — PROPOFOL 200 MG: 10 INJECTION, EMULSION INTRAVENOUS at 07:18

## 2020-09-30 RX ADMIN — SODIUM CHLORIDE, SODIUM LACTATE, POTASSIUM CHLORIDE, AND CALCIUM CHLORIDE: .6; .31; .03; .02 INJECTION, SOLUTION INTRAVENOUS at 07:14

## 2020-09-30 RX ADMIN — PROPOFOL 100 MG: 10 INJECTION, EMULSION INTRAVENOUS at 07:20

## 2020-09-30 NOTE — ANESTHESIA POSTPROCEDURE EVALUATION
Post-Op Assessment Note    CV Status:  Stable  Pain Score: 0    Pain management: adequate     Mental Status:  Sleepy and awake   Hydration Status:  Euvolemic   PONV Controlled:  Controlled   Airway Patency:  Patent      Post Op Vitals Reviewed: Yes      Staff: CRNA         No complications documented      /65 (09/30/20 0729)    Temp 97 6 °F (36 4 °C) (09/30/20 0729)    Pulse 83 (09/30/20 0729)   Resp 22 (09/30/20 0729)    SpO2 94 % (09/30/20 0729)

## 2020-09-30 NOTE — ANESTHESIA PREPROCEDURE EVALUATION
Procedure:  EGD    Relevant Problems   CARDIO   (+) Hyperlipidemia      Other   (+) Abdominal discomfort   (+) BMI 27 0-27 9,adult        Physical Exam    Airway    Mallampati score: I  TM Distance: >3 FB  Neck ROM: full     Dental       Cardiovascular      Pulmonary      Other Findings        Anesthesia Plan  ASA Score- 1     Anesthesia Type- IV sedation with anesthesia with ASA Monitors  Additional Monitors:   Airway Plan:     Comment: Recent labs personally reviewed:  Lab Results       Component                Value               Date                       WBC                      4 17 (L)            08/24/2020                 HGB                      15 5                08/24/2020                 PLT                      241                 08/24/2020            Lab Results       Component                Value               Date                       NA                       139                 11/19/2014                 K                        4 4                 08/24/2020                 BUN                      19                  08/24/2020                 CREATININE               1 04                08/24/2020                 GLUCOSE                  91                  11/19/2014            No results found for: PTT   No results found for: INR    Blood type     I, Jennifer Rai MD, have personally seen and evaluated the patient prior to anesthetic care  I have reviewed the pre-anesthetic record, medical history, allergies, medications and any other medical records if appropriate to the anesthetic care  If a CRNA is involved in the case, I have reviewed the CRNA assessment, if present, and agree  Patient consented for IV Sedation, general anesthesia as back up  Discussed risks of aspiration, IV infiltration, indications for conversion to general anesthesia  All questions and concerns addressed          Plan Factors-Exercise tolerance (METS): >4 METS  Chart reviewed  Existing labs reviewed  Patient is not a current smoker  Patient did not smoke on day of surgery  Obstructive sleep apnea risk education given perioperatively  Induction- intravenous  Postoperative Plan-     Informed Consent- Anesthetic plan and risks discussed with patient  I personally reviewed this patient with the CRNA  Discussed and agreed on the Anesthesia Plan with the CRNA  Tara Jack

## 2020-10-01 ENCOUNTER — OFFICE VISIT (OUTPATIENT)
Dept: FAMILY MEDICINE CLINIC | Facility: CLINIC | Age: 30
End: 2020-10-01
Payer: COMMERCIAL

## 2020-10-01 VITALS
OXYGEN SATURATION: 98 % | HEIGHT: 69 IN | HEART RATE: 77 BPM | WEIGHT: 191 LBS | DIASTOLIC BLOOD PRESSURE: 66 MMHG | SYSTOLIC BLOOD PRESSURE: 116 MMHG | RESPIRATION RATE: 18 BRPM | BODY MASS INDEX: 28.29 KG/M2 | TEMPERATURE: 98.5 F

## 2020-10-01 DIAGNOSIS — E78.5 HYPERLIPIDEMIA, UNSPECIFIED HYPERLIPIDEMIA TYPE: Primary | ICD-10-CM

## 2020-10-01 PROBLEM — Z11.59 SPECIAL SCREENING EXAMINATION FOR UNSPECIFIED VIRAL DISEASE: Status: RESOLVED | Noted: 2020-08-20 | Resolved: 2020-10-01

## 2020-10-01 PROCEDURE — 99213 OFFICE O/P EST LOW 20 MIN: CPT | Performed by: NURSE PRACTITIONER

## 2020-11-02 ENCOUNTER — HOSPITAL ENCOUNTER (OUTPATIENT)
Dept: CT IMAGING | Facility: HOSPITAL | Age: 30
Discharge: HOME | End: 2020-11-02
Payer: COMMERCIAL

## 2020-11-02 DIAGNOSIS — E78.5 HYPERLIPIDEMIA, UNSPECIFIED HYPERLIPIDEMIA TYPE: ICD-10-CM

## 2020-11-02 PROCEDURE — 75571 CT HRT W/O DYE W/CA TEST: CPT

## 2020-11-02 PROCEDURE — G1004 CDSM NDSC: HCPCS

## 2020-11-09 DIAGNOSIS — E78.5 HYPERLIPIDEMIA, UNSPECIFIED HYPERLIPIDEMIA TYPE: Primary | ICD-10-CM

## 2020-11-09 RX ORDER — ROSUVASTATIN CALCIUM 5 MG/1
5 TABLET, COATED ORAL DAILY
Qty: 30 TABLET | Refills: 2 | Status: SHIPPED | OUTPATIENT
Start: 2020-11-09

## 2020-12-19 ENCOUNTER — IMMUNIZATIONS (OUTPATIENT)
Dept: FAMILY MEDICINE CLINIC | Facility: HOSPITAL | Age: 30
End: 2020-12-19
Payer: COMMERCIAL

## 2020-12-19 DIAGNOSIS — Z23 ENCOUNTER FOR IMMUNIZATION: ICD-10-CM

## 2020-12-19 PROCEDURE — 91300 SARS-COV-2 / COVID-19 MRNA VACCINE (PFIZER-BIONTECH) 30 MCG: CPT

## 2020-12-19 PROCEDURE — 0001A SARS-COV-2 / COVID-19 MRNA VACCINE (PFIZER-BIONTECH) 30 MCG: CPT

## 2021-01-08 ENCOUNTER — IMMUNIZATIONS (OUTPATIENT)
Dept: FAMILY MEDICINE CLINIC | Facility: HOSPITAL | Age: 31
End: 2021-01-08

## 2021-01-08 DIAGNOSIS — Z23 ENCOUNTER FOR IMMUNIZATION: ICD-10-CM

## 2021-01-08 PROCEDURE — 91300 SARS-COV-2 / COVID-19 MRNA VACCINE (PFIZER-BIONTECH) 30 MCG: CPT

## 2021-01-08 PROCEDURE — 0002A SARS-COV-2 / COVID-19 MRNA VACCINE (PFIZER-BIONTECH) 30 MCG: CPT

## 2021-09-08 ENCOUNTER — APPOINTMENT (OUTPATIENT)
Dept: LAB | Facility: HOSPITAL | Age: 31
End: 2021-09-08

## 2021-09-08 DIAGNOSIS — Z00.8 HEALTH EXAMINATION IN POPULATION SURVEY: ICD-10-CM

## 2021-09-08 LAB
CHOLEST SERPL-MCNC: 268 MG/DL (ref 50–200)
EST. AVERAGE GLUCOSE BLD GHB EST-MCNC: 100 MG/DL
HBA1C MFR BLD: 5.1 %
HDLC SERPL-MCNC: 60 MG/DL
LDLC SERPL CALC-MCNC: 180 MG/DL (ref 0–100)
NONHDLC SERPL-MCNC: 208 MG/DL
TRIGL SERPL-MCNC: 141 MG/DL

## 2021-09-08 PROCEDURE — 36415 COLL VENOUS BLD VENIPUNCTURE: CPT

## 2021-09-08 PROCEDURE — 83036 HEMOGLOBIN GLYCOSYLATED A1C: CPT

## 2021-09-08 PROCEDURE — 80061 LIPID PANEL: CPT

## 2021-09-14 ENCOUNTER — IMMUNIZATIONS (OUTPATIENT)
Dept: FAMILY MEDICINE CLINIC | Facility: HOSPITAL | Age: 31
End: 2021-09-14

## 2021-09-14 DIAGNOSIS — Z23 ENCOUNTER FOR IMMUNIZATION: Primary | ICD-10-CM

## 2021-09-14 PROCEDURE — 0001A SARS-COV-2 / COVID-19 MRNA VACCINE (PFIZER-BIONTECH) 30 MCG: CPT

## 2021-09-14 PROCEDURE — 91300 SARS-COV-2 / COVID-19 MRNA VACCINE (PFIZER-BIONTECH) 30 MCG: CPT

## 2021-11-15 ENCOUNTER — OFFICE VISIT (OUTPATIENT)
Dept: FAMILY MEDICINE CLINIC | Facility: CLINIC | Age: 31
End: 2021-11-15
Payer: COMMERCIAL

## 2021-11-15 VITALS
HEIGHT: 69 IN | HEART RATE: 83 BPM | RESPIRATION RATE: 18 BRPM | DIASTOLIC BLOOD PRESSURE: 80 MMHG | OXYGEN SATURATION: 97 % | SYSTOLIC BLOOD PRESSURE: 104 MMHG | BODY MASS INDEX: 29.77 KG/M2 | WEIGHT: 201 LBS | TEMPERATURE: 97.9 F

## 2021-11-15 DIAGNOSIS — B35.1 TOENAIL FUNGUS: ICD-10-CM

## 2021-11-15 DIAGNOSIS — M25.511 ACUTE PAIN OF RIGHT SHOULDER: ICD-10-CM

## 2021-11-15 DIAGNOSIS — E78.5 HYPERLIPIDEMIA, UNSPECIFIED HYPERLIPIDEMIA TYPE: Primary | ICD-10-CM

## 2021-11-15 PROCEDURE — 99214 OFFICE O/P EST MOD 30 MIN: CPT | Performed by: NURSE PRACTITIONER

## 2021-11-15 RX ORDER — MELOXICAM 15 MG/1
15 TABLET ORAL DAILY
Qty: 30 TABLET | Refills: 0 | Status: SHIPPED | OUTPATIENT
Start: 2021-11-15 | End: 2021-12-16

## 2021-11-17 ENCOUNTER — HOSPITAL ENCOUNTER (OUTPATIENT)
Dept: MRI IMAGING | Facility: HOSPITAL | Age: 31
Discharge: HOME | End: 2021-11-17
Payer: COMMERCIAL

## 2021-11-17 DIAGNOSIS — M25.511 ACUTE PAIN OF RIGHT SHOULDER: ICD-10-CM

## 2021-11-17 PROCEDURE — G1004 CDSM NDSC: HCPCS

## 2021-11-17 PROCEDURE — 73221 MRI JOINT UPR EXTREM W/O DYE: CPT

## 2021-12-29 PROCEDURE — 87635 SARS-COV-2 COVID-19 AMP PRB: CPT

## 2022-01-04 ENCOUNTER — OFFICE VISIT (OUTPATIENT)
Dept: PODIATRY | Facility: CLINIC | Age: 32
End: 2022-01-04
Payer: COMMERCIAL

## 2022-01-04 VITALS — HEIGHT: 69 IN | BODY MASS INDEX: 29.18 KG/M2 | WEIGHT: 197 LBS

## 2022-01-04 DIAGNOSIS — B35.1 ONYCHOMYCOSIS: Primary | ICD-10-CM

## 2022-01-04 PROCEDURE — 99203 OFFICE O/P NEW LOW 30 MIN: CPT | Performed by: PODIATRIST

## 2022-01-04 RX ORDER — TERBINAFINE HYDROCHLORIDE 250 MG/1
TABLET ORAL
Qty: 42 TABLET | Refills: 0 | Status: SHIPPED | OUTPATIENT
Start: 2022-01-04 | End: 2022-04-04

## 2022-01-04 NOTE — PROGRESS NOTES
Assessment/Plan:    Explained to patient that while left great toenail may be mycotic it may also be dystrophic from prior injury  Discussed treatment options  Did not recommend topicals due to poor success rate  Also do not want to recommend standard dosing of Lamisil  Recommended pulse dose Lamisil  Patient should take 1 tablet daily for 2 weeks and then discontinue  He should continue this regimen for 2 months  This regimen should not affect his liver  Patient unsure as to whether he will start this medication but desires it prescribed  No problem-specific Assessment & Plan notes found for this encounter  Diagnoses and all orders for this visit:    Onychomycosis  -     terbinafine (LamISIL) 250 mg tablet; Take 1 tablet daily for 2 weeks and then discontinue  Continue this regimen for 2 additional months  Subjective:      Patient ID: Aditi Zuniga is a 33 y o  male  HPI     Patient presents with concern regarding his left great toenail  This toenail is thick with white/yellow discoloration  Patient notes that he has had difficulty with fungal toenails throughout his life  He has taken oral Lamisil in 3 occasions and all nails responded with the exception of the left great 1  Patient also relates a history of tinea pedis but he has no issues at this time  This left great toenail is not painful  It is cosmetically displeasing  Patient has tried topicals to no avail  I personally reviewed a liver function test dated 08/24/2020  All liver enzymes are within normal limits  The following portions of the patient's history were reviewed and updated as appropriate: allergies, current medications, past family history, past medical history, past social history, past surgical history and problem list     Review of Systems   Respiratory: Negative  Cardiovascular: Negative  Gastrointestinal: Negative  Musculoskeletal: Negative  Neurological: Negative  Objective:      Ht 5' 9" (1 753 m) Comment: verbal  Wt 89 4 kg (197 lb)   BMI 29 09 kg/m²          Physical Exam  Constitutional:       Appearance: Normal appearance  Cardiovascular:      Pulses: Normal pulses  Musculoskeletal:         General: Normal range of motion  Skin:     Comments: Left great toenail is dystrophic  There is yellow/white discoloration and mild onycholysis  Neurological:      General: No focal deficit present  Mental Status: He is oriented to person, place, and time

## 2022-01-19 DIAGNOSIS — M25.511 ACUTE PAIN OF RIGHT SHOULDER: ICD-10-CM

## 2022-01-19 RX ORDER — MELOXICAM 15 MG/1
TABLET ORAL
Qty: 30 TABLET | Refills: 0 | Status: SHIPPED | OUTPATIENT
Start: 2022-01-19 | End: 2022-05-01

## 2022-05-01 DIAGNOSIS — M25.511 ACUTE PAIN OF RIGHT SHOULDER: ICD-10-CM

## 2022-05-01 RX ORDER — MELOXICAM 15 MG/1
TABLET ORAL
Qty: 30 TABLET | Refills: 0 | Status: SHIPPED | OUTPATIENT
Start: 2022-05-01 | End: 2022-05-29

## 2022-05-29 DIAGNOSIS — M25.511 ACUTE PAIN OF RIGHT SHOULDER: ICD-10-CM

## 2022-05-29 RX ORDER — MELOXICAM 15 MG/1
TABLET ORAL
Qty: 30 TABLET | Refills: 0 | Status: SHIPPED | OUTPATIENT
Start: 2022-05-29 | End: 2022-07-14

## 2022-06-16 ENCOUNTER — HOSPITAL ENCOUNTER (EMERGENCY)
Facility: HOSPITAL | Age: 32
Discharge: HOME | End: 2022-06-16
Attending: EMERGENCY MEDICINE
Payer: COMMERCIAL

## 2022-06-16 VITALS
HEIGHT: 69 IN | BODY MASS INDEX: 26.36 KG/M2 | SYSTOLIC BLOOD PRESSURE: 107 MMHG | DIASTOLIC BLOOD PRESSURE: 58 MMHG | RESPIRATION RATE: 16 BRPM | OXYGEN SATURATION: 99 % | WEIGHT: 178 LBS | HEART RATE: 93 BPM | TEMPERATURE: 98.4 F

## 2022-06-16 DIAGNOSIS — E80.6 HYPERBILIRUBINEMIA: ICD-10-CM

## 2022-06-16 DIAGNOSIS — R11.2 NAUSEA AND VOMITING: Primary | ICD-10-CM

## 2022-06-16 LAB
ALBUMIN SERPL BCP-MCNC: 4.1 G/DL (ref 3.5–5)
ALP SERPL-CCNC: 53 U/L (ref 46–116)
ALT SERPL W P-5'-P-CCNC: 35 U/L (ref 12–78)
ANION GAP SERPL CALCULATED.3IONS-SCNC: 10 MMOL/L (ref 4–13)
AST SERPL W P-5'-P-CCNC: 21 U/L (ref 5–45)
ATRIAL RATE: 102 BPM
ATRIAL RATE: 112 BPM
BASOPHILS # BLD MANUAL: 0 THOUSAND/UL (ref 0–0.1)
BASOPHILS NFR MAR MANUAL: 0 % (ref 0–1)
BILIRUB SERPL-MCNC: 2.14 MG/DL (ref 0.2–1)
BUN SERPL-MCNC: 23 MG/DL (ref 5–25)
CALCIUM SERPL-MCNC: 9 MG/DL (ref 8.3–10.1)
CHLORIDE SERPL-SCNC: 102 MMOL/L (ref 100–108)
CO2 SERPL-SCNC: 28 MMOL/L (ref 21–32)
CREAT SERPL-MCNC: 1.04 MG/DL (ref 0.6–1.3)
EOSINOPHIL # BLD MANUAL: 0 THOUSAND/UL (ref 0–0.4)
EOSINOPHIL NFR BLD MANUAL: 0 % (ref 0–6)
ERYTHROCYTE [DISTWIDTH] IN BLOOD BY AUTOMATED COUNT: 11.9 % (ref 11.6–15.1)
GFR SERPL CREATININE-BSD FRML MDRD: 93 ML/MIN/1.73SQ M
GLUCOSE SERPL-MCNC: 130 MG/DL (ref 65–140)
HCT VFR BLD AUTO: 48.6 % (ref 36.5–49.3)
HGB BLD-MCNC: 16.7 G/DL (ref 12–17)
LIPASE SERPL-CCNC: 70 U/L (ref 73–393)
LYMPHOCYTES # BLD AUTO: 0.15 THOUSAND/UL (ref 0.6–4.47)
LYMPHOCYTES # BLD AUTO: 1 % (ref 14–44)
MCH RBC QN AUTO: 31.2 PG (ref 26.8–34.3)
MCHC RBC AUTO-ENTMCNC: 34.4 G/DL (ref 31.4–37.4)
MCV RBC AUTO: 91 FL (ref 82–98)
MONOCYTES # BLD AUTO: 0 THOUSAND/UL (ref 0–1.22)
MONOCYTES NFR BLD: 0 % (ref 4–12)
NEUTROPHILS # BLD MANUAL: 14.49 THOUSAND/UL (ref 1.85–7.62)
NEUTS BAND NFR BLD MANUAL: 5 % (ref 0–8)
NEUTS SEG NFR BLD AUTO: 94 % (ref 43–75)
P AXIS: 69 DEGREES
P AXIS: 72 DEGREES
PLATELET # BLD AUTO: 242 THOUSANDS/UL (ref 149–390)
PLATELET BLD QL SMEAR: ADEQUATE
PMV BLD AUTO: 10.5 FL (ref 8.9–12.7)
POTASSIUM SERPL-SCNC: 3.9 MMOL/L (ref 3.5–5.3)
PR INTERVAL: 164 MS
PR INTERVAL: 168 MS
PROT SERPL-MCNC: 8.3 G/DL (ref 6.4–8.2)
QRS AXIS: 78 DEGREES
QRS AXIS: 79 DEGREES
QRSD INTERVAL: 76 MS
QRSD INTERVAL: 78 MS
QT INTERVAL: 314 MS
QT INTERVAL: 322 MS
QTC INTERVAL: 419 MS
QTC INTERVAL: 428 MS
RBC # BLD AUTO: 5.35 MILLION/UL (ref 3.88–5.62)
SODIUM SERPL-SCNC: 140 MMOL/L (ref 136–145)
T WAVE AXIS: 45 DEGREES
T WAVE AXIS: 49 DEGREES
VENTRICULAR RATE: 102 BPM
VENTRICULAR RATE: 112 BPM
WBC # BLD AUTO: 14.64 THOUSAND/UL (ref 4.31–10.16)

## 2022-06-16 PROCEDURE — 85027 COMPLETE CBC AUTOMATED: CPT | Performed by: PHYSICIAN ASSISTANT

## 2022-06-16 PROCEDURE — 85007 BL SMEAR W/DIFF WBC COUNT: CPT | Performed by: PHYSICIAN ASSISTANT

## 2022-06-16 PROCEDURE — 93005 ELECTROCARDIOGRAM TRACING: CPT

## 2022-06-16 PROCEDURE — 36415 COLL VENOUS BLD VENIPUNCTURE: CPT | Performed by: PHYSICIAN ASSISTANT

## 2022-06-16 PROCEDURE — 96361 HYDRATE IV INFUSION ADD-ON: CPT

## 2022-06-16 PROCEDURE — 93010 ELECTROCARDIOGRAM REPORT: CPT | Performed by: INTERNAL MEDICINE

## 2022-06-16 PROCEDURE — 80053 COMPREHEN METABOLIC PANEL: CPT | Performed by: PHYSICIAN ASSISTANT

## 2022-06-16 PROCEDURE — 83690 ASSAY OF LIPASE: CPT | Performed by: PHYSICIAN ASSISTANT

## 2022-06-16 PROCEDURE — 96374 THER/PROPH/DIAG INJ IV PUSH: CPT

## 2022-06-16 PROCEDURE — 99285 EMERGENCY DEPT VISIT HI MDM: CPT | Performed by: PHYSICIAN ASSISTANT

## 2022-06-16 PROCEDURE — 96375 TX/PRO/DX INJ NEW DRUG ADDON: CPT

## 2022-06-16 PROCEDURE — 99284 EMERGENCY DEPT VISIT MOD MDM: CPT

## 2022-06-16 RX ORDER — ACETAMINOPHEN 325 MG/1
650 TABLET ORAL ONCE
Status: COMPLETED | OUTPATIENT
Start: 2022-06-16 | End: 2022-06-16

## 2022-06-16 RX ORDER — DIPHENHYDRAMINE HYDROCHLORIDE 50 MG/ML
25 INJECTION INTRAMUSCULAR; INTRAVENOUS ONCE
Status: COMPLETED | OUTPATIENT
Start: 2022-06-16 | End: 2022-06-16

## 2022-06-16 RX ORDER — METOCLOPRAMIDE HYDROCHLORIDE 5 MG/ML
10 INJECTION INTRAMUSCULAR; INTRAVENOUS ONCE
Status: COMPLETED | OUTPATIENT
Start: 2022-06-16 | End: 2022-06-16

## 2022-06-16 RX ORDER — METOCLOPRAMIDE 10 MG/1
10 TABLET ORAL EVERY 6 HOURS PRN
Qty: 30 TABLET | Refills: 0 | Status: SHIPPED | OUTPATIENT
Start: 2022-06-16

## 2022-06-16 RX ADMIN — METOCLOPRAMIDE HYDROCHLORIDE 10 MG: 5 INJECTION INTRAMUSCULAR; INTRAVENOUS at 07:54

## 2022-06-16 RX ADMIN — DIPHENHYDRAMINE HYDROCHLORIDE 25 MG: 50 INJECTION INTRAMUSCULAR; INTRAVENOUS at 07:53

## 2022-06-16 RX ADMIN — SODIUM CHLORIDE 1000 ML: 0.9 INJECTION, SOLUTION INTRAVENOUS at 07:50

## 2022-06-16 RX ADMIN — ACETAMINOPHEN 650 MG: 325 TABLET, FILM COATED ORAL at 09:03

## 2022-06-16 NOTE — ED PROVIDER NOTES
History  Chief Complaint   Patient presents with    Vomiting     Pt reports eating frozen pizza yest evening, followed by multiple episodes of vomiting  Now feels weak and dizzy  28yo male with a history of hyperlipidemia presenting for evaluation of vomiting x 7 hours  Patient ate a frozen pizza yesterday evening for dinner and because nauseous afterwards  He developed vomiting around 0100 this morning and has vomited approximately 5x since his symptoms began  He denies any coffee ground emesis or hematemesis  Patient also reports mild epigastric discomfort which he attributes to vomiting  Due to his persistent symptoms, he decided to come to the ED  Urine output is reported as normal  Patient is otherwise asymptomatic and denies any diarrhea, fevers, chills, chest pain  History provided by:  Patient   used: No    Vomiting  Severity:  Moderate  Duration:  7 hours  Timing:  Constant  Quality:  Stomach contents  Recent urination:  Normal  Relieved by:  Nothing  Worsened by:  Nothing  Associated symptoms: abdominal pain    Associated symptoms: no chills, no diarrhea, no fever and no URI    Risk factors: suspect food intake    Risk factors: no diabetes        Prior to Admission Medications   Prescriptions Last Dose Informant Patient Reported? Taking? EPINEPHrine (EpiPen 2-Leighton) 0 3 mg/0 3 mL SOAJ   No No   Sig: Inject 0 3 mL (0 3 mg total) into a muscle once for 1 dose   meloxicam (MOBIC) 15 mg tablet   No No   Sig: TAKE 1 TABLET(15 MG) BY MOUTH DAILY   rosuvastatin (CRESTOR) 5 mg tablet   No No   Sig: Take 1 tablet (5 mg total) by mouth daily      Facility-Administered Medications: None       History reviewed  No pertinent past medical history  History reviewed  No pertinent surgical history      Family History   Problem Relation Age of Onset    Lung cancer Mother     Coronary artery disease Mother     Hypertension Mother     Hyperlipidemia Mother     Hypertension Father    Stephanie Splinter Hyperlipidemia Father     Hypertension Maternal Grandmother     Hyperlipidemia Maternal Grandmother     Heart attack Maternal Grandmother     Hypertension Maternal Grandfather     Hyperlipidemia Maternal Grandfather     Hypertension Paternal Grandmother     Hyperlipidemia Paternal Grandmother     Colon cancer Cousin      I have reviewed and agree with the history as documented  E-Cigarette/Vaping    E-Cigarette Use Never User      E-Cigarette/Vaping Substances    Nicotine No     THC No     CBD No     Flavoring No      Social History     Tobacco Use    Smoking status: Never Smoker    Smokeless tobacco: Never Used   Vaping Use    Vaping Use: Never used   Substance Use Topics    Alcohol use: Yes     Comment: socially    Drug use: Never       Review of Systems   Constitutional: Negative for chills and fever  HENT: Negative for drooling and voice change  Eyes: Negative for discharge and redness  Respiratory: Negative for shortness of breath and stridor  Cardiovascular: Negative for chest pain and leg swelling  Gastrointestinal: Positive for abdominal pain, nausea and vomiting  Negative for diarrhea  Musculoskeletal: Negative for neck pain and neck stiffness  Skin: Negative for color change and rash  Neurological: Negative for seizures and syncope  Psychiatric/Behavioral: Negative for confusion  The patient is not nervous/anxious  All other systems reviewed and are negative  Physical Exam  Physical Exam  Vitals and nursing note reviewed  Constitutional:       General: He is not in acute distress  Appearance: He is well-developed  He is not diaphoretic  Comments: Non-toxic appearing   HENT:      Head: Normocephalic and atraumatic  Right Ear: External ear normal       Left Ear: External ear normal    Eyes:      General: No scleral icterus  Right eye: No discharge  Left eye: No discharge        Conjunctiva/sclera: Conjunctivae normal  Cardiovascular:      Rate and Rhythm: Normal rate and regular rhythm  Heart sounds: Normal heart sounds  No murmur heard  Pulmonary:      Effort: Pulmonary effort is normal  No respiratory distress  Breath sounds: Normal breath sounds  No stridor  No wheezing or rales  Abdominal:      General: Bowel sounds are normal  There is no distension  Palpations: Abdomen is soft  Tenderness: There is no abdominal tenderness  There is no guarding  Comments: Abdomen soft, non-tender, non-distended   Musculoskeletal:         General: No deformity  Normal range of motion  Cervical back: Normal range of motion and neck supple  Skin:     General: Skin is warm and dry  Neurological:      General: No focal deficit present  Mental Status: He is alert  He is not disoriented  GCS: GCS eye subscore is 4  GCS verbal subscore is 5  GCS motor subscore is 6     Psychiatric:         Mood and Affect: Mood normal          Behavior: Behavior normal          Vital Signs  ED Triage Vitals   Temperature Pulse Respirations Blood Pressure SpO2   06/16/22 0742 06/16/22 0742 06/16/22 0742 06/16/22 0742 06/16/22 0742   98 4 °F (36 9 °C) (!) 123 17 119/73 100 %      Temp Source Heart Rate Source Patient Position - Orthostatic VS BP Location FiO2 (%)   06/16/22 0742 06/16/22 0742 06/16/22 0742 06/16/22 0742 --   Oral Monitor Lying Left arm       Pain Score       06/16/22 0903       4           Vitals:    06/16/22 0742 06/16/22 0900   BP: 119/73 107/58   Pulse: (!) 123 93   Patient Position - Orthostatic VS: Lying Lying         Visual Acuity      ED Medications  Medications   metoclopramide (REGLAN) injection 10 mg (10 mg Intravenous Given 6/16/22 0754)   diphenhydrAMINE (BENADRYL) injection 25 mg (25 mg Intravenous Given 6/16/22 0753)   sodium chloride 0 9 % bolus 1,000 mL (1,000 mL Intravenous New Bag 6/16/22 0750)   acetaminophen (TYLENOL) tablet 650 mg (650 mg Oral Given 6/16/22 0903)       Diagnostic Studies  Results Reviewed     Procedure Component Value Units Date/Time    Lipase [[id number]]  (Abnormal) Collected: 06/16/22 0758    Lab Status: Final result Specimen: Blood from Arm, Left Updated: 06/16/22 0906     Lipase 70 u/L     Comprehensive metabolic panel [[id number]]  (Abnormal) Collected: 06/16/22 0758    Lab Status: Final result Specimen: Blood from Arm, Left Updated: 06/16/22 0906     Sodium 140 mmol/L      Potassium 3 9 mmol/L      Chloride 102 mmol/L      CO2 28 mmol/L      ANION GAP 10 mmol/L      BUN 23 mg/dL      Creatinine 1 04 mg/dL      Glucose 130 mg/dL      Calcium 9 0 mg/dL      AST 21 U/L      ALT 35 U/L      Alkaline Phosphatase 53 U/L      Total Protein 8 3 g/dL      Albumin 4 1 g/dL      Total Bilirubin 2 14 mg/dL      eGFR 93 ml/min/1 73sq m     Narrative:      [place] guidelines for Chronic Kidney Disease (CKD):     Stage 1 with normal or high GFR (GFR > 90 mL/min/1 73 square meters)    Stage 2 Mild CKD (GFR = 60-89 mL/min/1 73 square meters)    Stage 3A Moderate CKD (GFR = 45-59 mL/min/1 73 square meters)    Stage 3B Moderate CKD (GFR = 30-44 mL/min/1 73 square meters)    Stage 4 Severe CKD (GFR = 15-29 mL/min/1 73 square meters)    Stage 5 End Stage CKD (GFR <15 mL/min/1 73 square meters)  Note: GFR calculation is accurate only with a steady state creatinine    CBC and differential [[id number]]  (Abnormal) Collected: 06/16/22 0758    Lab Status: Final result Specimen: Blood from Arm, Left Updated: 06/16/22 0830     WBC 14 64 Thousand/uL      RBC 5 35 Million/uL      Hemoglobin 16 7 g/dL      Hematocrit 48 6 %      MCV 91 fL      MCH 31 2 pg      MCHC 34 4 g/dL      RDW 11 9 %      MPV 10 5 fL      Platelets 104 Thousands/uL     Narrative: This is an appended report  These results have been appended to a previously verified report      Manual Differential(PHLEBS Do Not Order) [[id number]]  (Abnormal) Collected: 06/16/22 0758    Lab Status: Final result Specimen: Blood from Arm, Left Updated: 06/16/22 0830     Segmented % 94 %      Bands % 5 %      Lymphocytes % 1 %      Monocytes % 0 %      Eosinophils, % 0 %      Basophils % 0 %      Absolute Neutrophils 14 49 Thousand/uL      Lymphocytes Absolute 0 15 Thousand/uL      Monocytes Absolute 0 00 Thousand/uL      Eosinophils Absolute 0 00 Thousand/uL      Basophils Absolute 0 00 Thousand/uL      Total Counted --     Platelet Estimate Adequate                 No orders to display              Procedures  ECG 12 Lead Documentation Only    Date/Time: 6/16/2022 8:54 AM  Performed by: Giovanna Clancy PA-C  Authorized by: Giovanna Clancy PA-C     Indications / Diagnosis:  Vomiting  ECG reviewed by me, the ED Provider: yes    Patient location:  ED  Rate:     ECG rate:  102    ECG rate assessment: tachycardic    Rhythm:     Rhythm: sinus tachycardia    Ectopy:     Ectopy: none    QRS:     QRS axis:  Normal  Conduction:     Conduction: normal    ST segments:     ST segments:  Normal  T waves:     T waves: normal               ED Course  ED Course as of 06/16/22 0924   Thu Jun 16, 2022   0918 Patient feeling improved after medications and is requesting discharge  MDM  Number of Diagnoses or Management Options  Hyperbilirubinemia: new and requires workup  Nausea and vomiting: new and requires workup  Diagnosis management comments: 28yo male presenting for nausea and vomiting that began overnight  Ate frozen pizza last night  Mild soreness at upper abdomen but otherwise asymptomatic  No diarrhea or fevers  He is tachycardic on arrival but afebrile with otherwise normal vital signs  Abdominal exam is benign  Initial ED plan: Check abdominal labs  No indication for imaging at this time  IV Reglan, Benadryl, and fluid bolus for symptoms  Final assessment: Labs reveal a leukocytosis with a WBC of 14, likely reactive 2/2 vomiting  Total bilirubin elevated at 2 14   Remainder of LFTs are normal and he has no RUQ tenderness so unclear significance  Remainder of labs unremarkable including normal renal function and electrolytes  He is feeling improved after medications and tachycardia resolved after fluids  No indication for admission at this time  Script provided for Reglan and supportive care discussed  Advised close PCP follow-up  ED return precautions discussed  Patient expressed understanding and is agreeable to plan  Patient discharged in stable condition  Amount and/or Complexity of Data Reviewed  Clinical lab tests: ordered and reviewed  Tests in the medicine section of CPT®: ordered and reviewed  Review and summarize past medical records: yes  Independent visualization of images, tracings, or specimens: yes    Risk of Complications, Morbidity, and/or Mortality  Presenting problems: moderate  Diagnostic procedures: moderate  Management options: moderate    Patient Progress  Patient progress: improved      Disposition  Final diagnoses:   Nausea and vomiting   Hyperbilirubinemia     Time reflects when diagnosis was documented in both MDM as applicable and the Disposition within this note     Time User Action Codes Description Comment    6/16/2022  9:19 AM Ryder Carrillo [R11 2] Nausea and vomiting     6/16/2022  9:19 AM [place], [address] [E80 6] Hyperbilirubinemia       ED Disposition     ED Disposition   Discharge    Condition   Stable    Date/Time   u Jun 16, 2022  9:19 AM    Comment   Gregoria Mann discharge to home/self care                 Follow-up Information     Follow up With Specialties Details Why Contact Info Additional Information    Tomy Oates, [address], Nurse Practitioner Schedule an appointment as soon as possible for a visit   [address] [facility] Emergency Department Emergency Medicine  If symptoms worsen [address] [id number] [facility] Emergency Department, [address], [place], [facility]          Patient's Medications   Discharge Prescriptions    METOCLOPRAMIDE (REGLAN) 10 MG TABLET    Take 1 tablet (10 mg total) by mouth every 6 (six) hours as needed (nausea, vomiting)       Start Date: 6/16/2022 End Date: --       Order Dose: 10 mg       Quantity: 30 tablet    Refills: 0       No discharge procedures on file      PDMP Review     None          ED Provider  Electronically Signed by           Alex Fuller PA-C  06/16/22 1703

## 2022-06-16 NOTE — Clinical Note
Shawn Che was seen and treated in our emergency department on 6/16/2022  No restrictions    Other - See Comments    none    Diagnosis:     Isaac Rogers  may return to work on return date  He may return on this date: 06/17/2022         If you have any questions or concerns, please don't hesitate to call        Oli Kuhn PA-C    ______________________________           _______________          _______________  Hospital Representative                              Date                                Time

## 2022-06-16 NOTE — DISCHARGE INSTRUCTIONS
Take Reglan as needed for nausea  Drink small sips of fluids every 10-15 minutes for hydration  Advance slowly to a bland diet (rice, applesauce, toast, bananas)  Please follow-up with your family doctor  Return to the ER with any worsening symptoms

## 2022-07-14 DIAGNOSIS — M25.511 ACUTE PAIN OF RIGHT SHOULDER: ICD-10-CM

## 2022-07-14 RX ORDER — MELOXICAM 15 MG/1
TABLET ORAL
Qty: 30 TABLET | Refills: 0 | Status: SHIPPED | OUTPATIENT
Start: 2022-07-14 | End: 2022-08-17

## 2022-09-08 ENCOUNTER — OFFICE VISIT (OUTPATIENT)
Dept: FAMILY MEDICINE CLINIC | Facility: CLINIC | Age: 32
End: 2022-09-08
Payer: COMMERCIAL

## 2022-09-08 VITALS
DIASTOLIC BLOOD PRESSURE: 84 MMHG | BODY MASS INDEX: 27.08 KG/M2 | OXYGEN SATURATION: 97 % | WEIGHT: 182.8 LBS | SYSTOLIC BLOOD PRESSURE: 120 MMHG | TEMPERATURE: 98.1 F | HEIGHT: 69 IN | HEART RATE: 96 BPM

## 2022-09-08 DIAGNOSIS — E78.5 HYPERLIPIDEMIA, UNSPECIFIED HYPERLIPIDEMIA TYPE: ICD-10-CM

## 2022-09-08 DIAGNOSIS — Z00.00 ANNUAL PHYSICAL EXAM: Primary | ICD-10-CM

## 2022-09-08 DIAGNOSIS — F98.8 ATTENTION DEFICIT DISORDER (ADD) WITHOUT HYPERACTIVITY: ICD-10-CM

## 2022-09-08 PROBLEM — R10.9 ABDOMINAL DISCOMFORT: Status: RESOLVED | Noted: 2020-08-20 | Resolved: 2022-09-08

## 2022-09-08 PROBLEM — M25.511 ACUTE PAIN OF RIGHT SHOULDER: Status: RESOLVED | Noted: 2021-11-15 | Resolved: 2022-09-08

## 2022-09-08 PROCEDURE — 99395 PREV VISIT EST AGE 18-39: CPT | Performed by: NURSE PRACTITIONER

## 2022-09-08 RX ORDER — DEXTROAMPHETAMINE SACCHARATE, AMPHETAMINE ASPARTATE MONOHYDRATE, DEXTROAMPHETAMINE SULFATE AND AMPHETAMINE SULFATE 6.25; 6.25; 6.25; 6.25 MG/1; MG/1; MG/1; MG/1
CAPSULE, EXTENDED RELEASE ORAL
COMMUNITY
Start: 2022-08-14

## 2022-09-08 NOTE — PATIENT INSTRUCTIONS

## 2022-09-08 NOTE — ASSESSMENT & PLAN NOTE
To continue Crestor 5 mg daily  To obtain labs  Will call with results  To continue daily physical activity and consuming a heart healthy diet

## 2022-09-08 NOTE — PROGRESS NOTES
[facility] [address] [facility]    NAME: Jnoathan Alexandra  AGE: 27 y o  SEX: male  : 1990     DATE: 2022     Assessment and Plan:     Problem List Items Addressed This Visit        Other    Hyperlipidemia     To continue Crestor 5 mg daily  To obtain labs  Will call with results  To continue daily physical activity and consuming a heart healthy diet  Relevant Orders    Comprehensive metabolic panel    Lipid Panel with Direct LDL reflex    Hemoglobin A1C    Annual physical exam - Primary     Patient is up-to-date on eye exam and dental cleaning  Attention deficit disorder (ADD) without hyperactivity     Stable  To continue Adderall XL 25 mg daily  Relevant Medications    amphetamine-dextroamphetamine (ADDERALL XR) 25 MG 24 hr capsule          Immunizations and preventive care screenings were discussed with patient today  Appropriate education was printed on patient's after visit summary  Counseling:  Alcohol/drug use: discussed moderation in alcohol intake, the recommendations for healthy alcohol use, and avoidance of illicit drug use  Dental Health: discussed importance of regular tooth brushing, flossing, and dental visits  Injury prevention: discussed safety/seat belts, safety helmets, smoke detectors, carbon dioxide detectors, and smoking near bedding or upholstery  Sexual health: discussed sexually transmitted diseases, partner selection, use of condoms, avoidance of unintended pregnancy, and contraceptive alternatives  · Exercise: the importance of regular exercise/physical activity was discussed  Recommend exercise 3-5 times per week for at least 30 minutes  No follow-ups on file       Chief Complaint:     Chief Complaint   Patient presents with    Medication Refill    Annual Exam      History of Present Illness:     Adult Annual Physical   Patient here for a comprehensive physical exam  The patient reports no problems  Diet and Physical Activity  · Diet/Nutrition: well balanced diet  · Exercise: vigorous cardiovascular exercise and 5-7 times a week on average  Depression Screening  PHQ-2/9 Depression Screening    Little interest or pleasure in doing things: 1 - several days  Feeling down, depressed, or hopeless: 0 - not at all       General Health  · Sleep: sleeps well and gets 7-8 hours of sleep on average  · Hearing: normal - bilateral   · Vision: no vision problems and most recent eye exam >1 year ago  · Dental: regular dental visits  [facility]  · History of STDs?: no      Review of Systems:     Review of Systems   Constitutional: Negative  HENT: Negative  Eyes: Negative  Respiratory: Negative  Cardiovascular: Negative  Gastrointestinal: Negative  Endocrine: Negative  Genitourinary: Negative  Musculoskeletal: Negative  Skin: Negative  Allergic/Immunologic: Negative  Neurological: Negative  Hematological: Negative  Psychiatric/Behavioral: Negative  Past Medical History:     No past medical history on file  Past Surgical History:     No past surgical history on file     Social History:     Social History     Socioeconomic History    Marital status: Single     Spouse name: Not on file    Number of children: Not on file    Years of education: Not on file    Highest education level: Not on file   Occupational History    Not on file   Tobacco Use    Smoking status: Never Smoker    Smokeless tobacco: Never Used   Vaping Use    Vaping Use: Never used   Substance and Sexual Activity    Alcohol use: Yes     Comment: socially    Drug use: Never    Sexual activity: Not on file   Other Topics Concern    Not on file   Social History Narrative    Not on file     Social Determinants of Health     Financial Resource Strain: Not on file   Food Insecurity: Not on file   Transportation Needs: Not on file Physical Activity: Not on file   Stress: Not on file   Social Connections: Not on file   Intimate Partner Violence: Not on file   Housing Stability: Not on file      Family History:     Family History   Problem Relation Age of Onset    Lung cancer Mother     Coronary artery disease Mother     Hypertension Mother     Hyperlipidemia Mother     Hypertension Father     Hyperlipidemia Father     Hypertension Maternal Grandmother     Hyperlipidemia Maternal Grandmother     Heart attack Maternal Grandmother     Hypertension Maternal Grandfather     Hyperlipidemia Maternal Grandfather     Hypertension Paternal Grandmother     Hyperlipidemia Paternal Grandmother     Colon cancer Cousin       Current Medications:     Current Outpatient Medications   Medication Sig Dispense Refill    rosuvastatin (CRESTOR) 5 mg tablet Take 1 tablet (5 mg total) by mouth daily 30 tablet 2    amphetamine-dextroamphetamine (ADDERALL XR) 25 MG 24 hr capsule       EPINEPHrine (EpiPen 2-Leighton) 0 3 mg/0 3 mL SOAJ Inject 0 3 mL (0 3 mg total) into a muscle once for 1 dose 0 6 mL 0     No current facility-administered medications for this visit  Allergies: Allergies   Allergen Reactions    Bee Venom Angioedema      Physical Exam:     /84 (BP Location: Left arm, Patient Position: Sitting, Cuff Size: Standard)   Pulse 96   Temp 98 1 °F (36 7 °C) (Tympanic)   Ht 5' 9" (1 753 m)   Wt 82 9 kg (182 lb 12 8 oz)   SpO2 97%   BMI 26 99 kg/m²     Physical Exam  Vitals and nursing note reviewed  Constitutional:       General: He is not in acute distress  Appearance: Normal appearance  He is well-developed  He is not ill-appearing, toxic-appearing or diaphoretic  HENT:      Head: Normocephalic and atraumatic        Right Ear: Tympanic membrane and external ear normal       Left Ear: Tympanic membrane and external ear normal       Nose: Nose normal       Mouth/Throat:      Mouth: Mucous membranes are moist  Pharynx: Oropharynx is clear  No oropharyngeal exudate  Eyes:      General: Lids are normal       Conjunctiva/sclera: Conjunctivae normal       Pupils: Pupils are equal, round, and reactive to light  Cardiovascular:      Rate and Rhythm: Normal rate and regular rhythm  Heart sounds: Normal heart sounds  No murmur heard  Pulmonary:      Effort: Pulmonary effort is normal  No respiratory distress  Breath sounds: Normal breath sounds  No stridor  No wheezing or rales  Chest:      Chest wall: No tenderness  Abdominal:      General: Bowel sounds are normal  There is no distension  Palpations: Abdomen is soft  There is no mass  Tenderness: There is no abdominal tenderness  There is no guarding or rebound  Hernia: No hernia is present  Musculoskeletal:         General: No deformity  Normal range of motion  Cervical back: Normal range of motion and neck supple  Lymphadenopathy:      Cervical: No cervical adenopathy  Skin:     General: Skin is warm and dry  Capillary Refill: Capillary refill takes less than 2 seconds  Neurological:      General: No focal deficit present  Mental Status: He is alert and oriented to person, place, and time  Psychiatric:         Mood and Affect: Mood normal          Behavior: Behavior normal          Thought Content: Thought content normal          Judgment: Judgment normal           MORRO Miles [address] [place] [address]  BMI Counseling: Body mass index is 26 99 kg/m²  The BMI is above normal  Nutrition recommendations include 3-5 servings of fruits/vegetables daily, reducing fast food intake, consuming healthier snacks, decreasing soda and/or juice intake, moderation in carbohydrate intake and increasing intake of lean protein  Exercise recommendations include moderate aerobic physical activity for 150 minutes/week

## 2022-12-23 ENCOUNTER — OFFICE VISIT (OUTPATIENT)
Dept: FAMILY MEDICINE CLINIC | Facility: CLINIC | Age: 32
End: 2022-12-23

## 2022-12-23 ENCOUNTER — TELEPHONE (OUTPATIENT)
Dept: OBGYN CLINIC | Facility: HOSPITAL | Age: 32
End: 2022-12-23

## 2022-12-23 VITALS
HEART RATE: 104 BPM | TEMPERATURE: 96.4 F | WEIGHT: 185.4 LBS | SYSTOLIC BLOOD PRESSURE: 116 MMHG | DIASTOLIC BLOOD PRESSURE: 74 MMHG | HEIGHT: 70 IN | RESPIRATION RATE: 16 BRPM | OXYGEN SATURATION: 99 % | BODY MASS INDEX: 26.54 KG/M2

## 2022-12-23 DIAGNOSIS — G56.02 CARPAL TUNNEL SYNDROME ON LEFT: Primary | ICD-10-CM

## 2022-12-23 DIAGNOSIS — Z91.030 BEE ALLERGY STATUS: ICD-10-CM

## 2022-12-23 DIAGNOSIS — E78.5 HYPERLIPIDEMIA, UNSPECIFIED HYPERLIPIDEMIA TYPE: ICD-10-CM

## 2022-12-23 DIAGNOSIS — M77.12 LATERAL EPICONDYLITIS, LEFT ELBOW: ICD-10-CM

## 2022-12-23 DIAGNOSIS — F98.8 ATTENTION DEFICIT DISORDER (ADD) WITHOUT HYPERACTIVITY: ICD-10-CM

## 2022-12-23 NOTE — PROGRESS NOTES
Name: Antonio Toscano      : 1990      MRN: [id number]  Encounter Provider: Miriam Armendariz DO  Encounter Date: 2022   Encounter department: [address] [place] [address]  Chief Complaint   Patient presents with   • New Patient Visit     Establish care    • Numbness     Tingling and numbness  in L arm and elbow pain pt had an injury back in 2019 in his left elbow      Patient Instructions   Here for establishing care and has hx of ADHD and hyperlipidemia and needs updated labs and will also see orthopedics for left lateral epicondylitis and will try left forearm brace and also may try left wrist splint for left CTS  May use meloxicam prn pain with food  Take ADHD and cholesterol med as directed and follow low cholesterol diet as directed  ADHD med does help on it for 1 year  Uses it during week and when he needs it  Assessment & Plan     1  Carpal tunnel syndrome on left  -     Ambulatory Referral to Orthopedic Surgery; Future    2  Lateral epicondylitis, left elbow  -     Ambulatory Referral to Orthopedic Surgery; Future    3  Attention deficit disorder (ADD) without hyperactivity    4  Bee allergy status    5  Hyperlipidemia, unspecified hyperlipidemia type  -     Comprehensive metabolic panel; Future  -     Lipid Panel with Direct LDL reflex; Future  -     Hemoglobin A1C           Subjective      New Patient Visit (Establish care )  Numbness (Tingling and numbness  in L arm and elbow pain pt had an injury back in 2019 in his left elbow )      Patient with left elbow pain and numbness and tingling left hand index and middle finger for 3 weeks  Tried Meloxicam 15 daily prn and tylenol and denies any trauma  Patient does have a 3year old boy and is   Patient on Crestor and takes med for ADHD  Patient is left handed  Patient does care baby son who is 3year old  Review of Systems   Constitutional: Negative  HENT: Negative  Eyes: Negative  Respiratory: Negative  Cardiovascular: Negative  Gastrointestinal: Negative  Endocrine: Negative  Genitourinary: Negative  Musculoskeletal:        Left lateral elbow pain and numbness and tingling in left hand  Skin: Negative  Allergic/Immunologic: Negative  Neurological: Negative  Hematological: Negative  Psychiatric/Behavioral: Negative  Current Outpatient Medications on File Prior to Visit   Medication Sig   • amphetamine-dextroamphetamine (ADDERALL XR) 25 MG 24 hr capsule 50 mg   • rosuvastatin (CRESTOR) 5 mg tablet Take 1 tablet (5 mg total) by mouth daily   • EPINEPHrine (EpiPen 2-Leighton) 0 3 mg/0 3 mL SOAJ Inject 0 3 mL (0 3 mg total) into a muscle once for 1 dose       Objective     /74 (BP Location: Left arm, Patient Position: Sitting, Cuff Size: Adult)   Pulse 104   Temp (!) 96 4 °F (35 8 °C) (Temporal)   Resp 16   Ht 5' 9 8" (1 773 m)   Wt 84 1 kg (185 lb 6 4 oz)   SpO2 99%   BMI 26 75 kg/m²     Physical Exam  Constitutional:       Appearance: Normal appearance  He is well-developed  HENT:      Head: Normocephalic and atraumatic  Eyes:      Conjunctiva/sclera: Conjunctivae normal       Pupils: Pupils are equal, round, and reactive to light  Cardiovascular:      Rate and Rhythm: Normal rate and regular rhythm  Heart sounds: Normal heart sounds  Pulmonary:      Effort: Pulmonary effort is normal       Breath sounds: Normal breath sounds  Musculoskeletal:         General: Tenderness (left lateral epicondylitis) present  Cervical back: Normal range of motion and neck supple  Comments: Left lateral epicondyle pain and in pm gets numbness and tingling in index and middle finger left hand  Skin:     General: Skin is warm and dry  Capillary Refill: Capillary refill takes less than 2 seconds  Neurological:      General: No focal deficit present  Mental Status: He is alert and oriented to person, place, and time        Deep Tendon Reflexes: Reflexes are normal and symmetric  Psychiatric:         Mood and Affect: Mood normal          Behavior: Behavior normal          Thought Content:  Thought content normal          Judgment: Judgment normal        Ashia Apa, DO

## 2022-12-23 NOTE — PATIENT INSTRUCTIONS
Here for establishing care and has hx of ADHD and hyperlipidemia and needs updated labs and will also see orthopedics for left lateral epicondylitis and will try left forearm brace and also may try left wrist splint for left CTS  May use meloxicam prn pain with food  Take ADHD and cholesterol med as directed and follow low cholesterol diet as directed  ADHD med does help on it for 1 year  Uses it during week and when he needs it

## 2023-06-15 ENCOUNTER — TELEPHONE (OUTPATIENT)
Dept: ADMINISTRATIVE | Facility: OTHER | Age: 33
End: 2023-06-15

## 2023-06-15 NOTE — TELEPHONE ENCOUNTER
Upon review of the In Basket request we were able to locate, review, and update the patient chart as requested for HIV  Any additional questions or concerns should be emailed to the Practice Liaisons via the appropriate education email address, please do not reply via In Basket      Thank you  Safia Patten

## 2023-06-15 NOTE — TELEPHONE ENCOUNTER
----- Message from Kaushik Caraballo sent at 6/15/2023 12:06 PM EDT -----  Regarding: care gap quality update -HIV      Hello, our patient No patient name on file  has had HIV completed/performed  Please assist in updating the patient chart by pulling the Care Everywhere (CE) document  The date of service is 03/29/2019       Thank you,  Kaushik Caraballo  [facility] [phone]

## 2023-06-16 ENCOUNTER — TELEPHONE (OUTPATIENT)
Dept: ADMINISTRATIVE | Facility: OTHER | Age: 33
End: 2023-06-16

## 2023-06-16 NOTE — TELEPHONE ENCOUNTER
Upon review of the In Basket request we have found this is a duplicate request and no further action is needed  This request was completed upon initial request, the patient chart is up to date, and this message will now be closed  Any additional questions or concerns should be emailed to the Practice Liaisons via the appropriate education email address, please do not reply via In Basket      Thank you  Anitha Huizar

## 2023-06-16 NOTE — TELEPHONE ENCOUNTER
----- Message from Mark Vazquez sent at 6/15/2023 12:06 PM EDT -----  Regarding: care gap quality update -HIV      Hello, our patient No patient name on file  has had HIV completed/performed  Please assist in updating the patient chart by pulling the Care Everywhere (CE) document  The date of service is 03/29/2019       Thank you,  Mark Vazquez  [facility] [phone]

## 2023-10-10 ENCOUNTER — OFFICE VISIT (OUTPATIENT)
Dept: FAMILY MEDICINE CLINIC | Facility: CLINIC | Age: 33
End: 2023-10-10
Payer: COMMERCIAL

## 2023-10-10 VITALS
TEMPERATURE: 97.2 F | HEIGHT: 70 IN | OXYGEN SATURATION: 97 % | WEIGHT: 202.6 LBS | HEART RATE: 86 BPM | DIASTOLIC BLOOD PRESSURE: 68 MMHG | BODY MASS INDEX: 29.01 KG/M2 | SYSTOLIC BLOOD PRESSURE: 124 MMHG

## 2023-10-10 DIAGNOSIS — B35.4 TINEA CORPORIS: ICD-10-CM

## 2023-10-10 DIAGNOSIS — Z23 ENCOUNTER FOR IMMUNIZATION: ICD-10-CM

## 2023-10-10 DIAGNOSIS — Z91.030 BEE ALLERGY STATUS: ICD-10-CM

## 2023-10-10 DIAGNOSIS — Z00.00 HEALTH CARE MAINTENANCE: Primary | ICD-10-CM

## 2023-10-10 DIAGNOSIS — E78.5 HYPERLIPIDEMIA, UNSPECIFIED HYPERLIPIDEMIA TYPE: ICD-10-CM

## 2023-10-10 DIAGNOSIS — F90.9 ATTENTION DEFICIT HYPERACTIVITY DISORDER (ADHD), UNSPECIFIED ADHD TYPE: ICD-10-CM

## 2023-10-10 PROCEDURE — 99395 PREV VISIT EST AGE 18-39: CPT | Performed by: FAMILY MEDICINE

## 2023-10-10 PROCEDURE — 90686 IIV4 VACC NO PRSV 0.5 ML IM: CPT

## 2023-10-10 PROCEDURE — 90471 IMMUNIZATION ADMIN: CPT

## 2023-10-10 RX ORDER — DEXTROAMPHETAMINE SACCHARATE, AMPHETAMINE ASPARTATE MONOHYDRATE, DEXTROAMPHETAMINE SULFATE AND AMPHETAMINE SULFATE 5; 5; 5; 5 MG/1; MG/1; MG/1; MG/1
20 CAPSULE, EXTENDED RELEASE ORAL EVERY MORNING
Qty: 30 CAPSULE | Refills: 0 | Status: SHIPPED | OUTPATIENT
Start: 2023-10-10

## 2023-10-10 RX ORDER — ROSUVASTATIN CALCIUM 5 MG/1
5 TABLET, COATED ORAL DAILY
Qty: 90 TABLET | Refills: 3 | Status: SHIPPED | OUTPATIENT
Start: 2023-10-10

## 2023-10-10 RX ORDER — CLOTRIMAZOLE AND BETAMETHASONE DIPROPIONATE 10; .64 MG/G; MG/G
CREAM TOPICAL 2 TIMES DAILY
Qty: 45 G | Refills: 1 | Status: SHIPPED | OUTPATIENT
Start: 2023-10-10

## 2023-10-10 RX ORDER — EPINEPHRINE 0.3 MG/.3ML
0.3 INJECTION SUBCUTANEOUS ONCE
Qty: 0.6 ML | Refills: 0 | Status: SHIPPED | OUTPATIENT
Start: 2023-10-10 | End: 2023-10-10

## 2023-10-10 NOTE — PROGRESS NOTES
Name: Jeremi Thomas      : 1990      MRN: [id number]  Encounter Provider: Glyn Hodgkin, DO  Encounter Date: 10/10/2023   Encounter department: [address] [place] [address]  Chief Complaint   Patient presents with   • Physical Exam     Physical, rash on left leg for about a week and half      Patient Instructions   Here for recheck and general PE and adhd stable and will decrease adhd dose to Adderall XR at 20 mg daily in am. Refilled cholesterol med and rec getting labs as directed. Trial of lotrisone cream prn rash left thigh. Lose weight to get BMI lower than 25. Try to get at least 8 hours of sleep per night. Assessment & Plan     1. Health care maintenance    2. Hyperlipidemia, unspecified hyperlipidemia type  -     rosuvastatin (CRESTOR) 5 mg tablet; Take 1 tablet (5 mg total) by mouth daily    3. Tinea corporis  -     clotrimazole-betamethasone (LOTRISONE) 1-0.05 % cream; Apply topically 2 (two) times a day    4. Encounter for immunization  -     influenza vaccine, quadrivalent, 0.5 mL, preservative-free, for adult and pediatric patients 6 mos+ (AFLURIA, FLUARIX, FLULAVAL, FLUZONE)    5. Attention deficit hyperactivity disorder (ADHD), unspecified ADHD type  -     amphetamine-dextroamphetamine (ADDERALL XR, 20MG,) 20 MG 24 hr capsule; Take 1 capsule (20 mg total) by mouth every morning Max Daily Amount: 20 mg    6. Bee allergy status  -     EPINEPHrine (EpiPen 2-Leighton) 0.3 mg/0.3 mL SOAJ; Inject 0.3 mL (0.3 mg total) into a muscle once for 1 dose           Subjective      Physical Exam (Physical, rash on left leg for about a week and half ). Patient would like to decrease adderall XR to 20 mg daily and some insomnia. Gained some weight recently. Review of Systems   Constitutional: Negative. HENT: Negative. Eyes: Negative. Respiratory: Negative. Cardiovascular: Negative. Gastrointestinal: Negative. Endocrine: Negative. Genitourinary: Negative.     Musculoskeletal: Negative. Skin: Negative. Allergic/Immunologic: Negative. Neurological: Negative. Hematological: Negative. Psychiatric/Behavioral: Negative. Adhd stable       Current Outpatient Medications on File Prior to Visit   Medication Sig   • [DISCONTINUED] amphetamine-dextroamphetamine (ADDERALL XR) 25 MG 24 hr capsule 50 mg   • [DISCONTINUED] rosuvastatin (CRESTOR) 5 mg tablet Take 1 tablet (5 mg total) by mouth daily   • [DISCONTINUED] EPINEPHrine (EpiPen 2-Leighton) 0.3 mg/0.3 mL SOAJ Inject 0.3 mL (0.3 mg total) into a muscle once for 1 dose       Objective     /68   Pulse 86   Temp (!) 97.2 °F (36.2 °C) (Temporal)   Ht 5' 9.8" (1.773 m)   Wt 91.9 kg (202 lb 9.6 oz)   SpO2 97%   BMI 29.24 kg/m²     Physical Exam  Constitutional:       Appearance: He is well-developed. Comments: overweight   HENT:      Head: Normocephalic and atraumatic. Right Ear: Tympanic membrane, ear canal and external ear normal.      Left Ear: Tympanic membrane, ear canal and external ear normal.      Nose: Nose normal.      Mouth/Throat:      Mouth: Mucous membranes are moist.   Eyes:      Conjunctiva/sclera: Conjunctivae normal.      Pupils: Pupils are equal, round, and reactive to light. Cardiovascular:      Rate and Rhythm: Normal rate and regular rhythm. Pulses: Normal pulses. Heart sounds: Normal heart sounds. Pulmonary:      Effort: Pulmonary effort is normal.      Breath sounds: Normal breath sounds. Abdominal:      General: Abdomen is flat. Bowel sounds are normal.      Palpations: Abdomen is soft. Genitourinary:     Penis: Normal.       Testes: Normal.   Musculoskeletal:         General: Normal range of motion. Cervical back: Normal range of motion and neck supple. Skin:     General: Skin is warm and dry. Capillary Refill: Capillary refill takes less than 2 seconds. Neurological:      General: No focal deficit present.       Mental Status: He is alert and oriented to person, place, and time. Mental status is at baseline. Deep Tendon Reflexes: Reflexes are normal and symmetric. Psychiatric:         Mood and Affect: Mood normal.         Behavior: Behavior normal.         Thought Content:  Thought content normal.         Judgment: Judgment normal.       Marquis Jay DO

## 2023-10-10 NOTE — PATIENT INSTRUCTIONS
Here for recheck and general PE and adhd stable and will decrease adhd dose to Adderall XR at 20 mg daily in am. Refilled cholesterol med and rec getting labs as directed. Trial of lotrisone cream prn rash left thigh. Lose weight to get BMI lower than 25. Try to get at least 8 hours of sleep per night.

## 2023-10-27 ENCOUNTER — DOCUMENTATION (OUTPATIENT)
Dept: CARDIOLOGY CLINIC | Facility: CLINIC | Age: 33
End: 2023-10-27

## 2023-12-11 DIAGNOSIS — F90.9 ATTENTION DEFICIT HYPERACTIVITY DISORDER (ADHD), UNSPECIFIED ADHD TYPE: ICD-10-CM

## 2023-12-11 RX ORDER — DEXTROAMPHETAMINE SACCHARATE, AMPHETAMINE ASPARTATE MONOHYDRATE, DEXTROAMPHETAMINE SULFATE AND AMPHETAMINE SULFATE 5; 5; 5; 5 MG/1; MG/1; MG/1; MG/1
20 CAPSULE, EXTENDED RELEASE ORAL EVERY MORNING
Qty: 30 CAPSULE | Refills: 0 | Status: SHIPPED | OUTPATIENT
Start: 2023-12-11

## 2024-03-05 ENCOUNTER — OFFICE VISIT (OUTPATIENT)
Dept: CARDIOLOGY CLINIC | Facility: CLINIC | Age: 34
End: 2024-03-05
Payer: COMMERCIAL

## 2024-03-05 ENCOUNTER — DOCUMENTATION (OUTPATIENT)
Dept: CARDIOLOGY CLINIC | Facility: CLINIC | Age: 34
End: 2024-03-05

## 2024-03-05 VITALS
BODY MASS INDEX: 28.14 KG/M2 | HEIGHT: 69 IN | SYSTOLIC BLOOD PRESSURE: 120 MMHG | WEIGHT: 190 LBS | DIASTOLIC BLOOD PRESSURE: 86 MMHG

## 2024-03-05 DIAGNOSIS — E78.00 PURE HYPERCHOLESTEROLEMIA: Primary | ICD-10-CM

## 2024-03-05 DIAGNOSIS — E78.00 PURE HYPERCHOLESTEROLEMIA: ICD-10-CM

## 2024-03-05 PROCEDURE — 99204 OFFICE O/P NEW MOD 45 MIN: CPT | Performed by: INTERNAL MEDICINE

## 2024-03-05 RX ORDER — PRAVASTATIN SODIUM 40 MG
40 TABLET ORAL DAILY
Qty: 90 TABLET | Refills: 3 | Status: SHIPPED | OUTPATIENT
Start: 2024-03-05

## 2024-03-05 RX ORDER — PRAVASTATIN SODIUM 40 MG
40 TABLET ORAL DAILY
Qty: 30 TABLET | Refills: 6 | Status: SHIPPED | OUTPATIENT
Start: 2024-03-05 | End: 2024-03-05

## 2024-03-05 NOTE — PROGRESS NOTES
[facility] Cardiology  Office Consultation  Carlos Garcia 33 y.o. male MRN: [id number]        Chief Complaint    Chief Complaint   Patient presents with    New Patient Visit     No complaints       Referring Provider: Self, Referral    Impression & Plan:    1. Pure hypercholesterolemia  Poorly controlled with LDL of 180 mg/dL on rosuvastatin 5 mg daily, which he does not tolerate due to myalgia.  We will try pravastatin 40 mg daily  Check lipid panel, Lp(a) now  Recheck lipid panel in 3 months if pravastatin tolerated  Discussed genetic testing for familial hypercholesterolemia. Given his mother's history of CABG at age 55 and his LDL levels, we will screen for FH with [facility] panel.          We will see Carlos Garcia back TBD    HPI: Carlos Garcia is a 33 y.o. year old male with hyperlipidemia    Patient is unsure of his biological father's medical history  His mother had CABG at age 55.  His brother does not have known ASCVD (age 40).     He is physically active. He exercises. He serves in [employer].     He has been on rosuvastatin 5 mg daily intermittently since Nov 2020. He has stopped it due to myalgias. He was unable to do pushups while on it.   Last LDL Sept 2021 was 180 mg/dL       Cardiac testing:         EKG reviewed personally:   EKG 6/16/22 - sinus tachycardia, 102 bpm, normal axis, normal intervals, otherwise normal study      Relevant Laboratory Studies:  (Laboratory studies personally reviewed)  CMP 6/16/22 - K 3.9, Cr 10.04, Tbili 2.14  Lipid panel 9/28/21 - , , HDL 60,  mg/dL     Review of Systems      No past medical history on file.  No past surgical history on file.  Social History     Substance and Sexual Activity   Alcohol Use Yes    Comment: socially     Social History     Substance and Sexual Activity   Drug Use Never     Social History     Tobacco Use   Smoking Status Never   Smokeless Tobacco Never     Family History   Problem Relation Age of Onset    Lung cancer Mother      Coronary artery disease Mother     Hypertension Mother     Hyperlipidemia Mother     Hypertension Father     Hyperlipidemia Father     Hypertension Maternal Grandmother     Hyperlipidemia Maternal Grandmother     Heart attack Maternal Grandmother     Hypertension Maternal Grandfather     Hyperlipidemia Maternal Grandfather     Hypertension Paternal Grandmother     Hyperlipidemia Paternal Grandmother     Colon cancer Cousin        Allergies:  Allergies   Allergen Reactions    Bee Venom Angioedema       Medications (as of START of this encounter):   Outpatient Medications Prior to Visit   Medication Sig Dispense Refill    amphetamine-dextroamphetamine (ADDERALL XR, 20MG,) 20 MG 24 hr capsule Take 1 capsule (20 mg total) by mouth every morning Max Daily Amount: 20 mg 30 capsule 0    clotrimazole-betamethasone (LOTRISONE) 1-0.05 % cream Apply topically 2 (two) times a day 45 g 1    EPINEPHrine (EpiPen 2-Leighton) 0.3 mg/0.3 mL SOAJ Inject 0.3 mL (0.3 mg total) into a muscle once for 1 dose 0.6 mL 0    rosuvastatin (CRESTOR) 5 mg tablet Take 1 tablet (5 mg total) by mouth daily 90 tablet 3     No facility-administered medications prior to visit.         Vitals:    03/05/24 1008   BP: 120/86     Weight (last 2 days)       Date/Time Weight    03/05/24 1008 86.2 (190)            General: Carlos Garcia is a well appearing male, in no acute distress, sitting comfortably  HEENT: moist mucous membranes, EOMI  Neck:  No JVD, supple, trachea midline   Cardiovascular: unremarkable S1/S2, regular rate and rhythm, no murmurs, rubs or gallops   Pulmonary: normal respiratory effort, CTAB   Abdomen: soft and nondistended  Extremities: No lower extremity edema. Warm and well perfused extremities.   Neuro: no focal motor deficits, AAOx3 (person, place, time)  Psych: Normal mood and affect, cooperative        Time Spent:  Total time (face-to-face and non-face-to-face) spent on today's visit was 30 minutes. This includes preparation for the  "visits (i.e. reviewing test results), performance of a medically appropriate history and examination, and orders for medications, tests or other procedures. This time is exclusive of procedures performed and time spent teaching.    Kaiser Rivera MD    This note was completed in part utilizing Mogi recognition software. Grammatical errors, random word insertion, spelling mistakes, occasional wrong word or \"sound-alike\" substitutions and incomplete sentences may be an occasional consequence of the system secondary to software limitations, ambient noise and hardware issues. At the time of dictation, efforts were made to edit, clarify and /or correct errors.  Please read the chart carefully and recognize, using context, where substitutions have occurred.  If you have any questions or concerns about the context, text or information contained within the body of this dictation, please contact myself, the provider, for further clarification.    "

## 2024-04-05 DIAGNOSIS — F90.9 ATTENTION DEFICIT HYPERACTIVITY DISORDER (ADHD), UNSPECIFIED ADHD TYPE: ICD-10-CM

## 2024-04-05 NOTE — TELEPHONE ENCOUNTER
Reason for call:   [x] Refill   [] Prior Auth  [] Other:     Office:   [x] PCP/Provider - [facility]  [] Specialty/Provider -     Medication:    Does the patient have enough for 3 days?   [] Yes   [x] No - Send as HP to POD

## 2024-04-08 RX ORDER — DEXTROAMPHETAMINE SACCHARATE, AMPHETAMINE ASPARTATE MONOHYDRATE, DEXTROAMPHETAMINE SULFATE AND AMPHETAMINE SULFATE 5; 5; 5; 5 MG/1; MG/1; MG/1; MG/1
20 CAPSULE, EXTENDED RELEASE ORAL EVERY MORNING
Qty: 30 CAPSULE | Refills: 0 | Status: SHIPPED | OUTPATIENT
Start: 2024-04-08

## 2024-04-10 ENCOUNTER — OFFICE VISIT (OUTPATIENT)
Dept: FAMILY MEDICINE CLINIC | Facility: CLINIC | Age: 34
End: 2024-04-10
Payer: COMMERCIAL

## 2024-04-10 ENCOUNTER — APPOINTMENT (OUTPATIENT)
Dept: LAB | Facility: HOSPITAL | Age: 34
End: 2024-04-10

## 2024-04-10 VITALS
OXYGEN SATURATION: 97 % | HEIGHT: 69 IN | DIASTOLIC BLOOD PRESSURE: 62 MMHG | WEIGHT: 207.4 LBS | BODY MASS INDEX: 30.72 KG/M2 | SYSTOLIC BLOOD PRESSURE: 110 MMHG | RESPIRATION RATE: 16 BRPM | HEART RATE: 107 BPM | TEMPERATURE: 96.8 F

## 2024-04-10 DIAGNOSIS — F98.8 ATTENTION DEFICIT DISORDER (ADD) WITHOUT HYPERACTIVITY: Primary | ICD-10-CM

## 2024-04-10 DIAGNOSIS — E78.00 PURE HYPERCHOLESTEROLEMIA: Primary | ICD-10-CM

## 2024-04-10 DIAGNOSIS — Z00.00 HEALTH CARE MAINTENANCE: ICD-10-CM

## 2024-04-10 DIAGNOSIS — E78.00 PURE HYPERCHOLESTEROLEMIA: ICD-10-CM

## 2024-04-10 DIAGNOSIS — Z91.030 BEE ALLERGY STATUS: ICD-10-CM

## 2024-04-10 PROCEDURE — 99214 OFFICE O/P EST MOD 30 MIN: CPT | Performed by: FAMILY MEDICINE

## 2024-04-10 NOTE — PATIENT INSTRUCTIONS
Here for adhd check and takes cholesterol med as directed. Call if any problems and get hga1c for the healthy care starts with you. Bee allergy hx and has epipen when needed. Use epipen when needed for bee sting allergy.

## 2024-04-10 NOTE — PROGRESS NOTES
"Chief Complaint   Patient presents with    Follow-up     Pt is here for follow up for adhd      Patient Instructions   Here for adhd check and takes cholesterol med as directed. Call if any problems and get hga1c for the healthy care starts with you. Bee allergy hx and has epipen when needed. Use epipen when needed for bee sting allergy.   Assessment/Plan:    No problem-specific Assessment & Plan notes found for this encounter.       Diagnoses and all orders for this visit:    Attention deficit disorder (ADD) without hyperactivity    Pure hypercholesterolemia  -     Hemoglobin A1C; Future    Bee allergy status    Health care maintenance  -     Hemoglobin A1C; Future          Subjective:      Patient ID: Carlos Garcia is a 33 y.o. male.    Follow-up (Pt is here for follow up for adhd ) Doing well and stable and has had issues with sleep and only used as needed. Not using med on weekends. Appetite good. Child on the way. Son turned 3 and wife due in October 2024.         The following portions of the patient's history were reviewed and updated as appropriate: allergies, current medications, past family history, past medical history, past social history, past surgical history, and problem list.    Review of Systems   Constitutional: Negative.    HENT: Negative.     Eyes: Negative.    Respiratory: Negative.     Cardiovascular: Negative.    Gastrointestinal: Negative.    Endocrine: Negative.    Genitourinary: Negative.    Musculoskeletal: Negative.    Skin: Negative.    Allergic/Immunologic: Negative.    Neurological: Negative.    Hematological: Negative.    Psychiatric/Behavioral:          ADHD stable         Objective:      /62   Pulse (!) 107   Temp (!) 96.8 °F (36 °C) (Temporal)   Resp 16   Ht 5' 9\" (1.753 m)   Wt 94.1 kg (207 lb 6.4 oz)   SpO2 97%   BMI 30.63 kg/m²          Physical Exam  Constitutional:       Appearance: He is well-developed.   HENT:      Head: Normocephalic and atraumatic.      Right " Ear: External ear normal.      Left Ear: External ear normal.      Nose: Nose normal.   Eyes:      Conjunctiva/sclera: Conjunctivae normal.      Pupils: Pupils are equal, round, and reactive to light.   Cardiovascular:      Rate and Rhythm: Normal rate and regular rhythm.      Heart sounds: Normal heart sounds.   Pulmonary:      Effort: Pulmonary effort is normal.      Breath sounds: Normal breath sounds.   Musculoskeletal:         General: Normal range of motion.      Cervical back: Normal range of motion and neck supple.   Skin:     General: Skin is warm and dry.   Neurological:      General: No focal deficit present.      Mental Status: He is alert and oriented to person, place, and time. Mental status is at baseline.      Deep Tendon Reflexes: Reflexes are normal and symmetric.   Psychiatric:         Mood and Affect: Mood normal.         Behavior: Behavior normal.         Thought Content: Thought content normal.         Judgment: Judgment normal.

## 2024-06-17 ENCOUNTER — HOSPITAL ENCOUNTER (EMERGENCY)
Facility: HOSPITAL | Age: 34
Discharge: HOME | End: 2024-06-17
Attending: EMERGENCY MEDICINE
Payer: COMMERCIAL

## 2024-06-17 VITALS
DIASTOLIC BLOOD PRESSURE: 70 MMHG | OXYGEN SATURATION: 97 % | TEMPERATURE: 98.7 F | SYSTOLIC BLOOD PRESSURE: 123 MMHG | HEART RATE: 106 BPM | RESPIRATION RATE: 18 BRPM

## 2024-06-17 DIAGNOSIS — R51.9 HEADACHE: Primary | ICD-10-CM

## 2024-06-17 PROCEDURE — 99283 EMERGENCY DEPT VISIT LOW MDM: CPT

## 2024-06-17 PROCEDURE — 96361 HYDRATE IV INFUSION ADD-ON: CPT

## 2024-06-17 PROCEDURE — 96375 TX/PRO/DX INJ NEW DRUG ADDON: CPT

## 2024-06-17 PROCEDURE — 96374 THER/PROPH/DIAG INJ IV PUSH: CPT

## 2024-06-17 PROCEDURE — 99284 EMERGENCY DEPT VISIT MOD MDM: CPT | Performed by: EMERGENCY MEDICINE

## 2024-06-17 RX ORDER — METOCLOPRAMIDE 10 MG/1
10 TABLET ORAL EVERY 6 HOURS PRN
Qty: 20 TABLET | Refills: 0 | Status: SHIPPED | OUTPATIENT
Start: 2024-06-17

## 2024-06-17 RX ORDER — METOCLOPRAMIDE HYDROCHLORIDE 5 MG/ML
10 INJECTION INTRAMUSCULAR; INTRAVENOUS ONCE
Status: COMPLETED | OUTPATIENT
Start: 2024-06-17 | End: 2024-06-17

## 2024-06-17 RX ORDER — KETOROLAC TROMETHAMINE 30 MG/ML
15 INJECTION, SOLUTION INTRAMUSCULAR; INTRAVENOUS ONCE
Status: COMPLETED | OUTPATIENT
Start: 2024-06-17 | End: 2024-06-17

## 2024-06-17 RX ORDER — DIPHENHYDRAMINE HYDROCHLORIDE 50 MG/ML
12.5 INJECTION INTRAMUSCULAR; INTRAVENOUS ONCE
Status: COMPLETED | OUTPATIENT
Start: 2024-06-17 | End: 2024-06-17

## 2024-06-17 RX ADMIN — DIPHENHYDRAMINE HYDROCHLORIDE 12.5 MG: 50 INJECTION, SOLUTION INTRAMUSCULAR; INTRAVENOUS at 16:06

## 2024-06-17 RX ADMIN — SODIUM CHLORIDE 1000 ML: 0.9 INJECTION, SOLUTION INTRAVENOUS at 16:02

## 2024-06-17 RX ADMIN — METOCLOPRAMIDE 10 MG: 5 INJECTION, SOLUTION INTRAMUSCULAR; INTRAVENOUS at 16:07

## 2024-06-17 RX ADMIN — KETOROLAC TROMETHAMINE 15 MG: 30 INJECTION, SOLUTION INTRAMUSCULAR; INTRAVENOUS at 16:05

## 2024-06-17 NOTE — ED PROVIDER NOTES
"History  Chief Complaint   Patient presents with    Medical Problem     Pt reports headache and fever since yesterday. Pt also reports diarrhea for one week. Denies abd pain.      Carlos is a very pleasant 33-year-old male here for evaluation of headache.  Reports that he does have a history of migraines but this headache feels somewhat different.  Typically his migraines will be associated with a visual aura and tend to be unilateral.  This headache, though gradual in onset was not associated with aura and is bilateral.  He does have some photophobia which is typical of his headaches.  He reports having a diarrheal illness for the past week or so which more or less resolved a day or 2 ago.  Denies focal neurologic symptoms such as numbness, weakness, tingling.  No speech or balance difficulties.  Triage note mentions \"fever\" though patient states he has not had any temperatures over 100 °F.  No neck pain or stiffness.      History provided by:  Patient  Medical Problem  Associated symptoms: headaches    Associated symptoms: no abdominal pain, no chest pain, no cough, no ear pain, no fever, no nausea, no rash, no shortness of breath, no sore throat and no vomiting        Prior to Admission Medications   Prescriptions Last Dose Informant Patient Reported? Taking?   EPINEPHrine (EpiPen 2-Leighton) 0.3 mg/0.3 mL SOAJ   No No   Sig: Inject 0.3 mL (0.3 mg total) into a muscle once for 1 dose   amphetamine-dextroamphetamine (ADDERALL XR, 20MG,) 20 MG 24 hr capsule   No No   Sig: Take 1 capsule (20 mg total) by mouth every morning Max Daily Amount: 20 mg   pravastatin (PRAVACHOL) 40 mg tablet   No No   Sig: TAKE 1 TABLET(40 MG) BY MOUTH DAILY      Facility-Administered Medications: None       History reviewed. No pertinent past medical history.    History reviewed. No pertinent surgical history.    Family History   Problem Relation Age of Onset    Lung cancer Mother     Coronary artery disease Mother     Hypertension Mother     " Hyperlipidemia Mother     Hypertension Father     Hyperlipidemia Father     Hypertension Maternal Grandmother     Hyperlipidemia Maternal Grandmother     Heart attack Maternal Grandmother     Hypertension Maternal Grandfather     Hyperlipidemia Maternal Grandfather     Hypertension Paternal Grandmother     Hyperlipidemia Paternal Grandmother     Colon cancer Cousin      I have reviewed and agree with the history as documented.    E-Cigarette/Vaping    E-Cigarette Use Never User      E-Cigarette/Vaping Substances    Nicotine No     THC No     CBD No     Flavoring No      Social History     Tobacco Use    Smoking status: Never    Smokeless tobacco: Never   Vaping Use    Vaping status: Never Used   Substance Use Topics    Alcohol use: Yes     Comment: socially    Drug use: Never       Review of Systems   Constitutional:  Negative for chills and fever.   HENT:  Negative for ear pain and sore throat.    Eyes:  Negative for visual disturbance.   Respiratory:  Negative for cough and shortness of breath.    Cardiovascular:  Negative for chest pain and palpitations.   Gastrointestinal:  Negative for abdominal pain, nausea and vomiting.   Genitourinary:  Negative for hematuria.   Musculoskeletal:  Negative for arthralgias and back pain.   Skin:  Negative for color change and rash.   Neurological:  Positive for headaches. Negative for seizures, syncope, weakness, light-headedness and numbness.   All other systems reviewed and are negative.      Physical Exam  Physical Exam  Vitals and nursing note reviewed.   Constitutional:       General: He is not in acute distress.     Appearance: He is well-developed.   HENT:      Head: Normocephalic and atraumatic.   Eyes:      Conjunctiva/sclera: Conjunctivae normal.   Cardiovascular:      Rate and Rhythm: Normal rate and regular rhythm.      Heart sounds: No murmur heard.  Pulmonary:      Effort: Pulmonary effort is normal. No respiratory distress.      Breath sounds: Normal breath  sounds.   Abdominal:      Palpations: Abdomen is soft.      Tenderness: There is no abdominal tenderness.   Musculoskeletal:         General: No swelling.      Cervical back: Neck supple.   Skin:     General: Skin is warm and dry.      Capillary Refill: Capillary refill takes less than 2 seconds.   Neurological:      General: No focal deficit present.      Mental Status: He is alert and oriented to person, place, and time.      Cranial Nerves: No cranial nerve deficit.      Sensory: No sensory deficit.      Motor: No weakness.      Coordination: Coordination normal.      Gait: Gait normal.   Psychiatric:         Mood and Affect: Mood normal.         Vital Signs  ED Triage Vitals   Temperature Pulse Respirations Blood Pressure SpO2   06/17/24 1509 06/17/24 1509 06/17/24 1509 06/17/24 1509 06/17/24 1509   98.7 °F (37.1 °C) (!) 106 18 123/70 97 %      Temp Source Heart Rate Source Patient Position - Orthostatic VS BP Location FiO2 (%)   06/17/24 1509 06/17/24 1509 -- 06/17/24 1509 --   Oral Monitor  Right arm       Pain Score       06/17/24 1605       7           Vitals:    06/17/24 1509   BP: 123/70   Pulse: (!) 106         Visual Acuity  Visual Acuity      Flowsheet Row Most Recent Value   L Pupil Size (mm) 3   R Pupil Size (mm) 3            ED Medications  Medications   sodium chloride 0.9 % bolus 1,000 mL (0 mL Intravenous Stopped 6/17/24 1659)   ketorolac (TORADOL) injection 15 mg (15 mg Intravenous Given 6/17/24 1605)   metoclopramide (REGLAN) injection 10 mg (10 mg Intravenous Given 6/17/24 1607)   diphenhydrAMINE (BENADRYL) injection 12.5 mg (12.5 mg Intravenous Given 6/17/24 1606)       Diagnostic Studies  Results Reviewed       None                   No orders to display              Procedures  Procedures         ED Course                               SBIRT 20yo+      Flowsheet Row Most Recent Value   Initial Alcohol Screen: US AUDIT-C     1. How often do you have a drink containing alcohol? 0 Filed at:  "06/17/2024 1525   2. How many drinks containing alcohol do you have on a typical day you are drinking?  0 Filed at: 06/17/2024 1525   3a. Male UNDER 65: How often do you have five or more drinks on one occasion? 0 Filed at: 06/17/2024 1525   Audit-C Score 0 Filed at: 06/17/2024 1525   JUMANA: How many times in the past year have you...    Used an illegal drug or used a prescription medication for non-medical reasons? Never Filed at: 06/17/2024 1525                      Medical Decision Making  33-year-old male with history of migraine headaches presented today for gradual onset bilateral frontal headache associated with photophobia.  No documented fevers and afebrile here in the ED.  No neck pain or stiffness.  No rashes.  He had a reassuring neurologic exam and no \"red flags\" on history or physical exam, was treated symptomatically with typical \"migraine cocktail\" on reevaluation he maintained a normal neurologic exam and reported resolution of his symptoms.  This point he is stable for discharge home to follow-up with his PCP.  Discussed outpatient follow-up and return precautions.    Risk  Prescription drug management.             Disposition  Final diagnoses:   Headache     Time reflects when diagnosis was documented in both MDM as applicable and the Disposition within this note       Time User Action Codes Description Comment    6/17/2024  5:16 PM Jeet Redmond Add [R51.9] Headache           ED Disposition       ED Disposition   Discharge    Condition   Stable    Date/Time   Mon Jun 17, 2024 1636    Comment   Carlos Garcia discharge to home/self care.                   Follow-up Information       Follow up With Specialties Details Why Contact Info    Clay La DO Family Medicine   [address]  [address]  [facility] [address]  [phone]              Discharge Medication List as of 6/17/2024  5:17 PM        START taking these medications    Details   metoclopramide (REGLAN) 10 mg tablet Take 1 " tablet (10 mg total) by mouth every 6 (six) hours as needed (n/v or headache), Starting Mon 6/17/2024, Normal           CONTINUE these medications which have NOT CHANGED    Details   amphetamine-dextroamphetamine (ADDERALL XR, 20MG,) 20 MG 24 hr capsule Take 1 capsule (20 mg total) by mouth every morning Max Daily Amount: 20 mg, Starting Mon 4/8/2024, Normal      EPINEPHrine (EpiPen 2-Leighton) 0.3 mg/0.3 mL SOAJ Inject 0.3 mL (0.3 mg total) into a muscle once for 1 dose, Starting Tue 10/10/2023, Normal      pravastatin (PRAVACHOL) 40 mg tablet TAKE 1 TABLET(40 MG) BY MOUTH DAILY, Starting Tue 3/5/2024, Normal             No discharge procedures on file.    PDMP Review         Value Time User    PDMP Reviewed  Yes 4/8/2024  8:36 AM Clay La DO            ED Provider  Electronically Signed by             Jeet Redmond MD  06/17/24 1361

## 2024-06-17 NOTE — Clinical Note
Carlos Garcia was seen and treated in our emergency department on 6/17/2024.                Diagnosis:     Carlos  may return to work on return date.    He may return on this date: 06/19/2024         If you have any questions or concerns, please don't hesitate to call.      Jeet Redmond MD    ______________________________           _______________          _______________  Hospital Representative                              Date                                Time

## 2024-06-19 ENCOUNTER — TELEPHONE (OUTPATIENT)
Dept: FAMILY MEDICINE CLINIC | Facility: CLINIC | Age: 34
End: 2024-06-19

## 2024-06-19 ENCOUNTER — OFFICE VISIT (OUTPATIENT)
Dept: FAMILY MEDICINE CLINIC | Facility: CLINIC | Age: 34
End: 2024-06-19
Payer: COMMERCIAL

## 2024-06-19 ENCOUNTER — APPOINTMENT (OUTPATIENT)
Dept: LAB | Facility: CLINIC | Age: 34
End: 2024-06-19
Payer: COMMERCIAL

## 2024-06-19 ENCOUNTER — HOSPITAL ENCOUNTER (OUTPATIENT)
Dept: RADIOLOGY | Facility: HOSPITAL | Age: 34
Discharge: HOME | End: 2024-06-19
Payer: COMMERCIAL

## 2024-06-19 VITALS
RESPIRATION RATE: 16 BRPM | OXYGEN SATURATION: 98 % | SYSTOLIC BLOOD PRESSURE: 122 MMHG | HEIGHT: 69 IN | DIASTOLIC BLOOD PRESSURE: 80 MMHG | WEIGHT: 210 LBS | BODY MASS INDEX: 31.1 KG/M2 | TEMPERATURE: 97.3 F | HEART RATE: 74 BPM

## 2024-06-19 DIAGNOSIS — R51.9 NONINTRACTABLE HEADACHE, UNSPECIFIED CHRONICITY PATTERN, UNSPECIFIED HEADACHE TYPE: ICD-10-CM

## 2024-06-19 DIAGNOSIS — Z00.8 HEALTH EXAMINATION IN POPULATION SURVEY: ICD-10-CM

## 2024-06-19 DIAGNOSIS — R53.83 FATIGUE, UNSPECIFIED TYPE: ICD-10-CM

## 2024-06-19 DIAGNOSIS — Z00.00 HEALTH CARE MAINTENANCE: ICD-10-CM

## 2024-06-19 DIAGNOSIS — R52 BODY ACHES: ICD-10-CM

## 2024-06-19 DIAGNOSIS — R50.9 FEELS FEVERISH: ICD-10-CM

## 2024-06-19 DIAGNOSIS — R51.9 NONINTRACTABLE HEADACHE, UNSPECIFIED CHRONICITY PATTERN, UNSPECIFIED HEADACHE TYPE: Primary | ICD-10-CM

## 2024-06-19 DIAGNOSIS — R00.2 PALPITATIONS: ICD-10-CM

## 2024-06-19 DIAGNOSIS — E78.00 PURE HYPERCHOLESTEROLEMIA: ICD-10-CM

## 2024-06-19 DIAGNOSIS — E78.5 HYPERLIPIDEMIA, UNSPECIFIED HYPERLIPIDEMIA TYPE: ICD-10-CM

## 2024-06-19 LAB
ALBUMIN SERPL BCG-MCNC: 4.4 G/DL (ref 3.5–5)
ALP SERPL-CCNC: 54 U/L (ref 34–104)
ALT SERPL W P-5'-P-CCNC: 38 U/L (ref 7–52)
ANION GAP SERPL CALCULATED.3IONS-SCNC: 12 MMOL/L (ref 4–13)
AST SERPL W P-5'-P-CCNC: 28 U/L (ref 13–39)
BASOPHILS # BLD AUTO: 0.02 THOUSANDS/ÂΜL (ref 0–0.1)
BASOPHILS NFR BLD AUTO: 0 % (ref 0–1)
BILIRUB SERPL-MCNC: 1.24 MG/DL (ref 0.2–1)
BUN SERPL-MCNC: 10 MG/DL (ref 5–25)
CALCIUM SERPL-MCNC: 9.4 MG/DL (ref 8.4–10.2)
CHLORIDE SERPL-SCNC: 101 MMOL/L (ref 96–108)
CHOLEST SERPL-MCNC: 279 MG/DL
CO2 SERPL-SCNC: 27 MMOL/L (ref 21–32)
CREAT SERPL-MCNC: 0.87 MG/DL (ref 0.6–1.3)
EOSINOPHIL # BLD AUTO: 0.06 THOUSAND/ÂΜL (ref 0–0.61)
EOSINOPHIL NFR BLD AUTO: 1 % (ref 0–6)
ERYTHROCYTE [DISTWIDTH] IN BLOOD BY AUTOMATED COUNT: 12.1 % (ref 11.6–15.1)
EST. AVERAGE GLUCOSE BLD GHB EST-MCNC: 105 MG/DL
GFR SERPL CREATININE-BSD FRML MDRD: 111 ML/MIN/1.73SQ M
GLUCOSE P FAST SERPL-MCNC: 91 MG/DL (ref 65–99)
HBA1C MFR BLD: 5.3 %
HCT VFR BLD AUTO: 43.9 % (ref 36.5–49.3)
HDLC SERPL-MCNC: 64 MG/DL
HGB BLD-MCNC: 14.7 G/DL (ref 12–17)
IMM GRANULOCYTES # BLD AUTO: 0.02 THOUSAND/UL (ref 0–0.2)
IMM GRANULOCYTES NFR BLD AUTO: 0 % (ref 0–2)
LDLC SERPL CALC-MCNC: 189 MG/DL (ref 0–100)
LYMPHOCYTES # BLD AUTO: 2.08 THOUSANDS/ÂΜL (ref 0.6–4.47)
LYMPHOCYTES NFR BLD AUTO: 30 % (ref 14–44)
MCH RBC QN AUTO: 30.9 PG (ref 26.8–34.3)
MCHC RBC AUTO-ENTMCNC: 33.5 G/DL (ref 31.4–37.4)
MCV RBC AUTO: 92 FL (ref 82–98)
MONOCYTES # BLD AUTO: 0.56 THOUSAND/ÂΜL (ref 0.17–1.22)
MONOCYTES NFR BLD AUTO: 8 % (ref 4–12)
NEUTROPHILS # BLD AUTO: 4.25 THOUSANDS/ÂΜL (ref 1.85–7.62)
NEUTS SEG NFR BLD AUTO: 61 % (ref 43–75)
NRBC BLD AUTO-RTO: 0 /100 WBCS
PLATELET # BLD AUTO: 282 THOUSANDS/UL (ref 149–390)
PMV BLD AUTO: 11.1 FL (ref 8.9–12.7)
POTASSIUM SERPL-SCNC: 3.5 MMOL/L (ref 3.5–5.3)
PROT SERPL-MCNC: 8 G/DL (ref 6.4–8.4)
RBC # BLD AUTO: 4.75 MILLION/UL (ref 3.88–5.62)
SODIUM SERPL-SCNC: 140 MMOL/L (ref 135–147)
TRIGL SERPL-MCNC: 132 MG/DL
WBC # BLD AUTO: 6.99 THOUSAND/UL (ref 4.31–10.16)

## 2024-06-19 PROCEDURE — 86617 LYME DISEASE ANTIBODY: CPT

## 2024-06-19 PROCEDURE — 86618 LYME DISEASE ANTIBODY: CPT

## 2024-06-19 PROCEDURE — 70450 CT HEAD/BRAIN W/O DYE: CPT

## 2024-06-19 PROCEDURE — 99214 OFFICE O/P EST MOD 30 MIN: CPT | Performed by: FAMILY MEDICINE

## 2024-06-19 PROCEDURE — 86663 EPSTEIN-BARR ANTIBODY: CPT

## 2024-06-19 PROCEDURE — 86665 EPSTEIN-BARR CAPSID VCA: CPT

## 2024-06-19 PROCEDURE — 83036 HEMOGLOBIN GLYCOSYLATED A1C: CPT

## 2024-06-19 PROCEDURE — 86664 EPSTEIN-BARR NUCLEAR ANTIGEN: CPT

## 2024-06-19 PROCEDURE — 85025 COMPLETE CBC W/AUTO DIFF WBC: CPT

## 2024-06-19 PROCEDURE — 86308 HETEROPHILE ANTIBODY SCREEN: CPT

## 2024-06-19 PROCEDURE — 80053 COMPREHEN METABOLIC PANEL: CPT

## 2024-06-19 PROCEDURE — 36415 COLL VENOUS BLD VENIPUNCTURE: CPT

## 2024-06-19 RX ORDER — BUTALBITAL, ACETAMINOPHEN AND CAFFEINE 50; 325; 40 MG/1; MG/1; MG/1
1 TABLET ORAL DAILY PRN
Qty: 14 TABLET | Refills: 0 | Status: SHIPPED | OUTPATIENT
Start: 2024-06-19

## 2024-06-19 NOTE — PATIENT INSTRUCTIONS
Check labs and STAT CT and r/o lyme as well as mono and subarachnoid hemorrhage. Patient to call if any problems. Here with wife.

## 2024-06-19 NOTE — PROGRESS NOTES
Ambulatory Visit  Name: Carlos Garcia      : 1990      MRN: [id number]  Encounter Provider: Clay La DO  Encounter Date: 2024   Encounter department: [facility] PRIMARY CARE  Chief Complaint   Patient presents with    Headache     Pt is here for headache times 2 days. Pt was seen at ED and given migraine cocktail which did not help for long. Pt is still having pressure and body aches and nausea and low grade fevers      Patient Instructions   Check labs and STAT CT and r/o lyme as well as mono and subarachnoid hemorrhage. Patient to call if any problems. Here with wife.     Assessment & Plan   1. Nonintractable headache, unspecified chronicity pattern, unspecified headache type  Comments:  check stat CT for most severe headache experienced in life and r/o subarachnoid hemorrhage.  Orders:  -     Lyme Total AB W Reflex to IGM/IGG; Future; Expected date: 2024  -     Comprehensive metabolic panel; Future; Expected date: 2024  -     CBC and differential; Future; Expected date: 2024  -     Linda Barr Virus Antibody Panel; Future; Expected date: 2024  -     Mononucleosis screen; Future; Expected date: 2024  2. Body aches  Comments:  stable and will check labs  Orders:  -     Lyme Total AB W Reflex to IGM/IGG; Future; Expected date: 2024  -     Comprehensive metabolic panel; Future; Expected date: 2024  -     CBC and differential; Future; Expected date: 2024  -     Linda Barr Virus Antibody Panel; Future; Expected date: 2024  -     Mononucleosis screen; Future; Expected date: 2024  3. Feels feverish  Comments:  stable  Orders:  -     Lyme Total AB W Reflex to IGM/IGG; Future; Expected date: 2024  -     Comprehensive metabolic panel; Future; Expected date: 2024  -     CBC and differential; Future; Expected date: 2024  -     Linda Barr Virus Antibody Panel; Future; Expected date: 2024  -      Mononucleosis screen; Future; Expected date: 06/19/2024  4. Palpitations  Comments:  stable  Orders:  -     Lyme Total AB W Reflex to IGM/IGG; Future; Expected date: 06/19/2024  -     Comprehensive metabolic panel; Future; Expected date: 06/19/2024  -     CBC and differential; Future; Expected date: 06/19/2024  -     Linda Barr Virus Antibody Panel; Future; Expected date: 06/19/2024  -     Mononucleosis screen; Future; Expected date: 06/19/2024  5. Fatigue, unspecified type  Comments:  check labs  Orders:  -     Lyme Total AB W Reflex to IGM/IGG; Future; Expected date: 06/19/2024  -     Comprehensive metabolic panel; Future; Expected date: 06/19/2024  -     CBC and differential; Future; Expected date: 06/19/2024  -     Linda Barr Virus Antibody Panel; Future; Expected date: 06/19/2024  -     Mononucleosis screen; Future; Expected date: 06/19/2024    [unfilled]  History of Present Illness     Here for severe Headache (Pt is here for headache times 2 days. Pt was seen at ED and given migraine cocktail which did not help for long. Pt is still having pressure and body aches and nausea and low grade fevers ) No known tick bites. 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 No known tick bites. Patient with joint aches and pains and also palpitations. Patient states this is unlike other headaches and is severe and worst headache of his life. It is temporal and occipital in location and also severe in nature. Here in room with wife.     Headache      Review of Systems   Constitutional:  Positive for fatigue and fever (feverish).   HENT: Negative.     Eyes: Negative.    Respiratory: Negative.     Cardiovascular:  Positive for palpitations.   Gastrointestinal: Negative.    Endocrine: Negative.    Genitourinary: Negative.    Musculoskeletal:  Positive for arthralgias and myalgias.   Skin:  "Negative.    Allergic/Immunologic: Negative.    Neurological:  Positive for headaches (severe and b/l temporal and occipital, has stated worst headache ever).   Hematological: Negative.    Psychiatric/Behavioral: Negative.       Current Outpatient Medications on File Prior to Visit   Medication Sig Dispense Refill    amphetamine-dextroamphetamine (ADDERALL XR, 20MG,) 20 MG 24 hr capsule Take 1 capsule (20 mg total) by mouth every morning Max Daily Amount: 20 mg 30 capsule 0    pravastatin (PRAVACHOL) 40 mg tablet TAKE 1 TABLET(40 MG) BY MOUTH DAILY 90 tablet 3    butalbital-acetaminophen-caffeine (Esgic) -40 mg per tablet Take 1 tablet by mouth daily as needed for headaches or migraine (Patient not taking: Reported on 6/19/2024) 14 tablet 0    EPINEPHrine (EpiPen 2-Leighton) 0.3 mg/0.3 mL SOAJ Inject 0.3 mL (0.3 mg total) into a muscle once for 1 dose 0.6 mL 0    metoclopramide (REGLAN) 10 mg tablet Take 1 tablet (10 mg total) by mouth every 6 (six) hours as needed (n/v or headache) (Patient not taking: Reported on 6/19/2024) 20 tablet 0     No current facility-administered medications on file prior to visit.      Objective     /80   Pulse 74   Temp (!) 97.3 °F (36.3 °C) (Temporal)   Resp 16   Ht 5' 9\" (1.753 m)   Wt 95.3 kg (210 lb)   SpO2 98%   BMI 31.01 kg/m²     Physical Exam  Constitutional:       Appearance: He is well-developed. He is obese.      Comments: Looks uncomfortable from headaches   HENT:      Head: Normocephalic and atraumatic.      Right Ear: Tympanic membrane, ear canal and external ear normal.      Left Ear: Tympanic membrane, ear canal and external ear normal.      Nose: Nose normal.      Mouth/Throat:      Mouth: Mucous membranes are moist.      Pharynx: Oropharynx is clear.   Eyes:      Conjunctiva/sclera: Conjunctivae normal.      Pupils: Pupils are equal, round, and reactive to light.   Cardiovascular:      Rate and Rhythm: Normal rate and regular rhythm.      Pulses: Normal " pulses.      Heart sounds: Normal heart sounds.   Pulmonary:      Effort: Pulmonary effort is normal.      Breath sounds: Normal breath sounds.   Abdominal:      General: Abdomen is flat. Bowel sounds are normal.      Palpations: Abdomen is soft.   Genitourinary:     Penis: Normal.       Testes: Normal.   Musculoskeletal:         General: No swelling, tenderness or signs of injury. Normal range of motion.      Cervical back: Normal range of motion and neck supple.      Right lower leg: No edema.      Left lower leg: No edema.   Skin:     General: Skin is warm and dry.      Capillary Refill: Capillary refill takes less than 2 seconds.      Coloration: Skin is not jaundiced or pale.      Findings: No bruising, erythema or rash.      Comments: No bull's eye rash   Neurological:      General: No focal deficit present.      Mental Status: He is alert and oriented to person, place, and time. Mental status is at baseline.      Cranial Nerves: No cranial nerve deficit.      Sensory: No sensory deficit.      Motor: No weakness.      Coordination: Coordination normal.      Gait: Gait normal.      Deep Tendon Reflexes: Reflexes are normal and symmetric. Reflexes normal.      Comments: No meningeal signs   Psychiatric:         Mood and Affect: Mood normal.         Behavior: Behavior normal.         Thought Content: Thought content normal.         Judgment: Judgment normal.       Administrative Statements   I have spent a total time of 30 minutes on 06/19/24 In caring for this patient including Diagnostic results, Prognosis, Risks and benefits of tx options, Instructions for management, Patient and family education, Importance of tx compliance, Risk factor reductions, Impressions, Counseling / Coordination of care, Documenting in the medical record, Reviewing / ordering tests, medicine, procedures  , and Obtaining or reviewing history  .

## 2024-06-19 NOTE — TELEPHONE ENCOUNTER
----- Message from Clay La DO sent at 6/19/2024  5:36 PM EDT -----  These labs are normal, please notify patient of normal results.     IMPRESSION:     -No acute vascular territory infarct, intracranial hemorrhage or mass effect.

## 2024-06-19 NOTE — TELEPHONE ENCOUNTER
"Patients wife called in and stated that the patient went to the ER two days ago for a really bad migraine. ER gave him a \"migraine cocktail\" to helped a little.Now he's having bad migraines with nausea and body aches.   Patients wife is requesting for patient to to be seen today.   Only two providers in office today with no availability.     Please advise  "

## 2024-06-19 NOTE — TELEPHONE ENCOUNTER
Spoke with patient's wife and she stated that they will like something sent to the their preferred pharmacy [facility] in [place] on [address].   Patients wife also scheduled an appt for Friday for the patient but is requesting a CT referral, cause the patient said that this is the worse migraine of his life ongoing for 4 days.  Patient's wife stated that they already went to the hospital two days ago and they didn't  do anything and that UC isn't going to do anything as well.       Please advise   Thank you

## 2024-06-19 NOTE — TELEPHONE ENCOUNTER
Left message for the patient to give the office a call back.    If you are a smoker, it is important for your health to stop smoking. Please be aware that second hand smoke is also harmful.

## 2024-06-20 DIAGNOSIS — A69.20 LYME DISEASE: Primary | ICD-10-CM

## 2024-06-20 LAB
B BURGDOR IGG SERPL QL IA: POSITIVE
B BURGDOR IGG+IGM SER QL IA: POSITIVE
B BURGDOR IGM SERPL QL IA: NEGATIVE
EBV NA IGG SER IA-ACNC: >600 U/ML (ref 0–17.9)
EBV VCA IGG SER IA-ACNC: >600 U/ML (ref 0–17.9)
EBV VCA IGM SER IA-ACNC: <36 U/ML (ref 0–35.9)
HETEROPH AB SER QL: NEGATIVE
INTERPRETATION: ABNORMAL
LPA SERPL-SCNC: 9.8 NMOL/L

## 2024-06-20 RX ORDER — DOXYCYCLINE HYCLATE 100 MG
100 TABLET ORAL 2 TIMES DAILY
Qty: 42 TABLET | Refills: 0 | Status: SHIPPED | OUTPATIENT
Start: 2024-06-20 | End: 2024-07-11

## 2024-06-24 DIAGNOSIS — E78.00 PURE HYPERCHOLESTEROLEMIA: Primary | ICD-10-CM

## 2024-06-24 RX ORDER — EZETIMIBE 10 MG/1
10 TABLET ORAL DAILY
Qty: 30 TABLET | Refills: 11 | Status: SHIPPED | OUTPATIENT
Start: 2024-06-24

## 2024-07-07 DIAGNOSIS — E78.00 PURE HYPERCHOLESTEROLEMIA: ICD-10-CM

## 2024-07-08 RX ORDER — EZETIMIBE 10 MG/1
10 TABLET ORAL DAILY
Qty: 100 TABLET | Refills: 1 | Status: SHIPPED | OUTPATIENT
Start: 2024-07-08

## 2024-07-11 ENCOUNTER — OFFICE VISIT (OUTPATIENT)
Dept: FAMILY MEDICINE CLINIC | Facility: CLINIC | Age: 34
End: 2024-07-11
Payer: COMMERCIAL

## 2024-07-11 VITALS
SYSTOLIC BLOOD PRESSURE: 124 MMHG | WEIGHT: 204.8 LBS | OXYGEN SATURATION: 97 % | TEMPERATURE: 97.5 F | BODY MASS INDEX: 30.33 KG/M2 | HEART RATE: 93 BPM | DIASTOLIC BLOOD PRESSURE: 78 MMHG | HEIGHT: 69 IN

## 2024-07-11 DIAGNOSIS — E78.00 PURE HYPERCHOLESTEROLEMIA: ICD-10-CM

## 2024-07-11 DIAGNOSIS — F90.9 ATTENTION DEFICIT HYPERACTIVITY DISORDER (ADHD), UNSPECIFIED ADHD TYPE: ICD-10-CM

## 2024-07-11 DIAGNOSIS — A69.20 LYME DISEASE: Primary | ICD-10-CM

## 2024-07-11 DIAGNOSIS — E66.09 CLASS 1 OBESITY DUE TO EXCESS CALORIES WITH BODY MASS INDEX (BMI) OF 30.0 TO 30.9 IN ADULT, UNSPECIFIED WHETHER SERIOUS COMORBIDITY PRESENT: ICD-10-CM

## 2024-07-11 PROCEDURE — 99214 OFFICE O/P EST MOD 30 MIN: CPT | Performed by: FAMILY MEDICINE

## 2024-07-11 RX ORDER — DEXTROAMPHETAMINE SACCHARATE, AMPHETAMINE ASPARTATE MONOHYDRATE, DEXTROAMPHETAMINE SULFATE AND AMPHETAMINE SULFATE 5; 5; 5; 5 MG/1; MG/1; MG/1; MG/1
20 CAPSULE, EXTENDED RELEASE ORAL EVERY MORNING
Qty: 30 CAPSULE | Refills: 0 | Status: SHIPPED | OUTPATIENT
Start: 2024-07-11

## 2024-07-11 NOTE — PATIENT INSTRUCTIONS
Here for lyme recheck and finished Doxycycline and symptoms of HA and palpitations resolved. Patient is now on Zetia and pravastatin by cardiology. Patient had CT scan brain and has Scattered atherosclerotic calcifications of the cavernous ICAs. Patient will aslo notify his cardiologist as well and may need to have Carotid US in future to monitor this. Call if any near syncope or lightheadedness/blurry visio.

## 2024-07-11 NOTE — PROGRESS NOTES
Ambulatory Visit  Name: Carlos Garcia      : 1990      MRN: [id number]  Encounter Provider: Clay La DO  Encounter Date: 2024   Encounter department: [facility] PRIMARY CARE  Chief Complaint   Patient presents with    Follow-up     F/u discussing lyme      Patient Instructions   Here for lyme recheck and finished Doxycycline and symptoms of HA and palpitations resolved. Patient is now on Zetia and pravastatin by cardiology. Patient had CT scan brain and has Scattered atherosclerotic calcifications of the cavernous ICAs. Patient will aslo notify his cardiologist as well and may need to have Carotid US in future to monitor this. Call if any near syncope or lightheadedness/blurry visio.     Assessment & Plan   1. Lyme disease  Comments:  finished Doxycycline for lyme  2. Attention deficit hyperactivity disorder (ADHD), unspecified ADHD type  Comments:  stable and refilled med today and recheck in 6 months  Orders:  -     amphetamine-dextroamphetamine (ADDERALL XR, 20MG,) 20 MG 24 hr capsule; Take 1 capsule (20 mg total) by mouth every morning Max Daily Amount: 20 mg  3. Pure hypercholesterolemia  Comments:  now on Zetia and Pravastatin  4. Class 1 obesity due to excess calories with body mass index (BMI) of 30.0 to 30.9 in adult, unspecified whether serious comorbidity present  Comments:  lose weight to get BMI lower than 25.    [unfilled]  History of Present Illness     Here for lyme recheck and finished doxy and feels much better.         Review of Systems   Constitutional: Negative.    HENT: Negative.     Eyes: Negative.    Respiratory: Negative.     Cardiovascular: Negative.    Gastrointestinal: Negative.    Endocrine: Negative.    Genitourinary: Negative.    Musculoskeletal: Negative.    Skin: Negative.    Allergic/Immunologic: Negative.    Neurological: Negative.    Hematological: Negative.    Psychiatric/Behavioral: Negative.       Current Outpatient Medications on File  "Prior to Visit   Medication Sig Dispense Refill    ezetimibe (ZETIA) 10 mg tablet TAKE 1 TABLET BY MOUTH EVERY  tablet 1    pravastatin (PRAVACHOL) 40 mg tablet TAKE 1 TABLET(40 MG) BY MOUTH DAILY 90 tablet 3    [DISCONTINUED] amphetamine-dextroamphetamine (ADDERALL XR, 20MG,) 20 MG 24 hr capsule Take 1 capsule (20 mg total) by mouth every morning Max Daily Amount: 20 mg 30 capsule 0    EPINEPHrine (EpiPen 2-Leighton) 0.3 mg/0.3 mL SOAJ Inject 0.3 mL (0.3 mg total) into a muscle once for 1 dose 0.6 mL 0    metoclopramide (REGLAN) 10 mg tablet Take 1 tablet (10 mg total) by mouth every 6 (six) hours as needed (n/v or headache) (Patient not taking: Reported on 6/19/2024) 20 tablet 0    [DISCONTINUED] butalbital-acetaminophen-caffeine (Esgic) -40 mg per tablet Take 1 tablet by mouth daily as needed for headaches or migraine (Patient not taking: Reported on 6/19/2024) 14 tablet 0    [DISCONTINUED] doxycycline hyclate (VIBRA-TABS) 100 mg tablet Take 1 tablet (100 mg total) by mouth 2 (two) times a day for 21 days Avoid direct sun exposure while on Doxycycline / use sun protatection (Patient not taking: Reported on 7/11/2024) 42 tablet 0     No current facility-administered medications on file prior to visit.      Objective     /78   Pulse 93   Temp 97.5 °F (36.4 °C) (Temporal)   Ht 5' 9\" (1.753 m)   Wt 92.9 kg (204 lb 12.8 oz)   SpO2 97%   BMI 30.24 kg/m²     Physical Exam  Constitutional:       Appearance: He is well-developed. He is obese.   HENT:      Head: Normocephalic and atraumatic.      Right Ear: External ear normal.      Left Ear: External ear normal.      Nose: Nose normal.   Eyes:      Conjunctiva/sclera: Conjunctivae normal.      Pupils: Pupils are equal, round, and reactive to light.   Cardiovascular:      Rate and Rhythm: Normal rate and regular rhythm.      Pulses: Normal pulses.      Heart sounds: Normal heart sounds.   Pulmonary:      Effort: Pulmonary effort is normal.      Breath " sounds: Normal breath sounds.   Musculoskeletal:         General: Normal range of motion.      Cervical back: Normal range of motion and neck supple.   Skin:     General: Skin is warm and dry.      Capillary Refill: Capillary refill takes less than 2 seconds.   Neurological:      General: No focal deficit present.      Mental Status: He is alert and oriented to person, place, and time. Mental status is at baseline.      Deep Tendon Reflexes: Reflexes are normal and symmetric.   Psychiatric:         Mood and Affect: Mood normal.         Behavior: Behavior normal.         Thought Content: Thought content normal.         Judgment: Judgment normal.       Administrative Statements   I have spent a total time of 30 minutes in caring for this patient on the day of the visit/encounter including Diagnostic results, Prognosis, Risks and benefits of tx options, Instructions for management, Patient and family education, Importance of tx compliance, Risk factor reductions, Impressions, Counseling / Coordination of care, Documenting in the medical record, Reviewing / ordering tests, medicine, procedures  , and Obtaining or reviewing history  .

## 2024-08-28 ENCOUNTER — OFFICE VISIT (OUTPATIENT)
Dept: DENTISTRY | Facility: CLINIC | Age: 34
End: 2024-08-28

## 2024-08-28 VITALS — SYSTOLIC BLOOD PRESSURE: 121 MMHG | HEART RATE: 91 BPM | DIASTOLIC BLOOD PRESSURE: 80 MMHG | TEMPERATURE: 98.4 F

## 2024-08-28 DIAGNOSIS — Z01.20 ENCOUNTER FOR DENTAL EXAMINATION: Primary | ICD-10-CM

## 2024-08-28 PROCEDURE — D0210 INTRAORAL - COMPLETE SERIES OF RADIOGRAPHIC IMAGES: HCPCS | Performed by: DENTAL HYGIENIST

## 2024-08-28 PROCEDURE — D0150 COMPREHENSIVE ORAL EVALUATION - NEW OR ESTABLISHED PATIENT: HCPCS | Performed by: DENTIST

## 2024-08-28 NOTE — DENTAL PROCEDURE DETAILS
COMP EXAM, FMX, PROBE EXAM   REVIEWED MED HX: meds, allergies, health changes reviewed in EPIC  CHIEF CONCERN:     -Last dental visit was a couple years ago.  Pt was in the  and they did not do cleanings.  PAIN SCALE:  0  ASA CLASS:  ASA 2 - Patient with mild systemic disease with no functional limitations  PLAQUE:  mild  CALCULUS: Light  BLEEDING:  none  STAIN :  None  PERIO:  Healthy gingiva,  mild localized bone loss  ---FMP - 1-4 mm no BUP  ---Stage 1, Grade A  ---Prophy recommended    Visual and Tactile Intraoral/ Extraoral evaluation: Oral and Oropharyngeal cancer evaluation. No findings     Dr. Monzon -  Reviewed with patient clinical and radiographic findings and patient verbalized understanding. All questions and concerns addressed.     REFERRALS: None    CARIES FINDINGS:   Watch areas as charted       NEXT VISIT:   NV1;  Initial pro - 50 min    Last FMX :  8/28/24

## 2024-11-13 DIAGNOSIS — F90.9 ATTENTION DEFICIT HYPERACTIVITY DISORDER (ADHD), UNSPECIFIED ADHD TYPE: ICD-10-CM

## 2024-11-13 RX ORDER — DEXTROAMPHETAMINE SACCHARATE, AMPHETAMINE ASPARTATE MONOHYDRATE, DEXTROAMPHETAMINE SULFATE AND AMPHETAMINE SULFATE 5; 5; 5; 5 MG/1; MG/1; MG/1; MG/1
20 CAPSULE, EXTENDED RELEASE ORAL EVERY MORNING
Qty: 30 CAPSULE | Refills: 0 | Status: SHIPPED | OUTPATIENT
Start: 2024-11-13

## 2024-11-13 NOTE — TELEPHONE ENCOUNTER
Reason for call:   [x] Refill   [] Prior Auth  [] Other:     Office:   [x] PCP/Provider - Clay La DO   [] Specialty/Provider -     Medication: amphetamine-dextroamphetamine (ADDERALL XR, 20MG,) 20 MG 24 hr capsule     Dose/Frequency: Take 1 capsule (20 mg total) by mouth every morning Max Daily Amount: 20 mg     Quantity: 30    Pharmacy: [address] [place] - [address]     Does the patient have enough for 3 days?   [] Yes   [x] No - Send as HP to POD

## 2024-12-10 PROBLEM — M25.569 ARTHRALGIA OF KNEE: Status: ACTIVE | Noted: 2024-12-10

## 2024-12-12 ENCOUNTER — OFFICE VISIT (OUTPATIENT)
Dept: DENTISTRY | Facility: CLINIC | Age: 34
End: 2024-12-12

## 2024-12-12 VITALS — DIASTOLIC BLOOD PRESSURE: 73 MMHG | HEART RATE: 76 BPM | SYSTOLIC BLOOD PRESSURE: 109 MMHG

## 2024-12-12 DIAGNOSIS — K03.6 DENTAL CALCULUS: ICD-10-CM

## 2024-12-12 DIAGNOSIS — K03.6 ACCRETIONS ON TEETH: Primary | ICD-10-CM

## 2024-12-12 PROCEDURE — D1110 PROPHYLAXIS - ADULT: HCPCS

## 2024-12-12 NOTE — PROGRESS NOTES
INITIAL ADULT PROPHY , (no xrays due )   REVIEWED MED HX: meds, allergies, health changes reviewed in [facility]. All consents signed.  CHIEF CONCERN: sometimes has bleeding UL when he flosses  PAIN SCALE:  0  ASA CLASS:  II  PLAQUE:  mild  CALCULUS:  moderate  BLEEDING:   none  STAIN :   light      PERIO: no perio issues    Hygiene Procedures:  Scaled, Polished, Flossed and Used Cavitron    Oral Hygiene Instruction: Brushing Minimum 2x daily for 2 minutes, daily flossing, Recommended soft toothbrush only, and Electric toothbrush    Dispensed: Toothbrush, Toothpaste, Floss and Flossers    Visual and Tactile Intraoral/ Extraoral evaluation: Oral and Oropharyngeal cancer evaluation. No findings     NO exam needed  had comp exam 8/28/24   Reviewed with patient clinical and radiographic findings and patient verbalized understanding. All questions and concerns addressed.     REFERRALS: none    CARIES FINDINGS:  monitor incipient areas   added # 15 B       TREATMENT  PLAN :      Next Recall: 6 month recall     Last BWX:   Last Panorex/ FMX : 8/2024

## 2025-01-13 ENCOUNTER — OFFICE VISIT (OUTPATIENT)
Dept: FAMILY MEDICINE CLINIC | Facility: CLINIC | Age: 35
End: 2025-01-13
Payer: COMMERCIAL

## 2025-01-13 VITALS
OXYGEN SATURATION: 96 % | TEMPERATURE: 98.2 F | HEIGHT: 69 IN | BODY MASS INDEX: 31.4 KG/M2 | HEART RATE: 76 BPM | WEIGHT: 212 LBS | DIASTOLIC BLOOD PRESSURE: 68 MMHG | SYSTOLIC BLOOD PRESSURE: 112 MMHG

## 2025-01-13 DIAGNOSIS — F98.8 ATTENTION DEFICIT DISORDER (ADD) WITHOUT HYPERACTIVITY: Primary | ICD-10-CM

## 2025-01-13 DIAGNOSIS — R00.2 PALPITATIONS: ICD-10-CM

## 2025-01-13 DIAGNOSIS — E78.00 PURE HYPERCHOLESTEROLEMIA: ICD-10-CM

## 2025-01-13 PROCEDURE — 99214 OFFICE O/P EST MOD 30 MIN: CPT | Performed by: FAMILY MEDICINE

## 2025-01-13 PROCEDURE — 93000 ELECTROCARDIOGRAM COMPLETE: CPT | Performed by: FAMILY MEDICINE

## 2025-01-13 RX ORDER — DEXTROAMPHETAMINE SACCHARATE, AMPHETAMINE ASPARTATE MONOHYDRATE, DEXTROAMPHETAMINE SULFATE AND AMPHETAMINE SULFATE 3.75; 3.75; 3.75; 3.75 MG/1; MG/1; MG/1; MG/1
15 CAPSULE, EXTENDED RELEASE ORAL EVERY MORNING
Qty: 30 CAPSULE | Refills: 0 | Status: SHIPPED | OUTPATIENT
Start: 2025-01-13

## 2025-01-13 NOTE — ASSESSMENT & PLAN NOTE
Take adderall xr 15 mg daily and call if any problems.   Orders:  •  amphetamine-dextroamphetamine (ADDERALL XR, 15MG,) 15 MG 24 hr capsule; Take 1 capsule (15 mg total) by mouth every morning Max Daily Amount: 15 mg

## 2025-01-13 NOTE — PROGRESS NOTES
Name: Carlos Garcia      : 1990      MRN: [id number]  Encounter Provider: Clay La DO  Encounter Date: 2025   Encounter department: [facility] PRIMARY CARE  :  Chief Complaint   Patient presents with   • Follow-up     Discuss ADHD, has been having some chest fullness at times at rest with palpitations      Patient Instructions   Here for adhd and doing well except palpitations and will rec lower dose of adderall xr 15 mg daily and will call if palpitations not better. Take cholesterol med as directed. Decreases caffeine and any decongestants and also call if worse. Get sleep. EKG and labs started. Patient in [employer]. EKG stable and no ST-T changes. Call if any problems. Consider cardiology consult and holter if not better. Patient to call in 1 month with update.     Assessment & Plan  Attention deficit disorder (ADD) without hyperactivity  Take adderall xr 15 mg daily and call if any problems.   Orders:  •  amphetamine-dextroamphetamine (ADDERALL XR, 15MG,) 15 MG 24 hr capsule; Take 1 capsule (15 mg total) by mouth every morning Max Daily Amount: 15 mg    Palpitations  Monitor med and see lower adderall XR 15 mg daily is helping.   Orders:  •  TSH, 3rd generation; Future  •  T4, free; Future  •  Comprehensive metabolic panel; Future    Pure hypercholesterolemia  Low cholesterol diet and rec calling today.   Orders:  •  TSH, 3rd generation; Future  •  T4, free; Future  •  Comprehensive metabolic panel; Future           History of Present Illness     HPI  Review of Systems   Constitutional: Negative.    HENT: Negative.     Eyes: Negative.    Respiratory: Negative.     Cardiovascular:  Positive for palpitations.   Gastrointestinal: Negative.    Endocrine: Negative.    Genitourinary: Negative.    Musculoskeletal: Negative.    Skin: Negative.    Allergic/Immunologic: Negative.    Neurological: Negative.    Hematological: Negative.    Psychiatric/Behavioral: Negative.          Adhd  "stable       Objective   /68   Pulse 76   Temp 98.2 °F (36.8 °C) (Temporal)   Ht 5' 9\" (1.753 m)   Wt 96.2 kg (212 lb)   SpO2 96%   BMI 31.31 kg/m²      Physical Exam  Constitutional:       Appearance: He is well-developed.   HENT:      Head: Normocephalic and atraumatic.      Right Ear: External ear normal.      Left Ear: External ear normal.      Nose: Nose normal.   Eyes:      Conjunctiva/sclera: Conjunctivae normal.      Pupils: Pupils are equal, round, and reactive to light.   Cardiovascular:      Rate and Rhythm: Normal rate and regular rhythm.      Pulses: Normal pulses.      Heart sounds: Normal heart sounds.   Pulmonary:      Effort: Pulmonary effort is normal.      Breath sounds: Normal breath sounds.   Musculoskeletal:         General: Normal range of motion.      Cervical back: Normal range of motion and neck supple.   Skin:     General: Skin is warm and dry.      Capillary Refill: Capillary refill takes less than 2 seconds.   Neurological:      General: No focal deficit present.      Mental Status: He is alert and oriented to person, place, and time. Mental status is at baseline.      Deep Tendon Reflexes: Reflexes are normal and symmetric.   Psychiatric:         Mood and Affect: Mood normal.         Behavior: Behavior normal.         Thought Content: Thought content normal.         Judgment: Judgment normal.      Comments: Stable ADHD       Administrative Statements   I have spent a total time of 30 minutes in caring for this patient on the day of the visit/encounter including Diagnostic results, Prognosis, Risks and benefits of tx options, Instructions for management, Patient and family education, Importance of tx compliance, Risk factor reductions, Impressions, Counseling / Coordination of care, Documenting in the medical record, Reviewing / ordering tests, medicine, procedures  , and Obtaining or reviewing history  .   "

## 2025-01-13 NOTE — ASSESSMENT & PLAN NOTE
Low cholesterol diet and rec calling today.   Orders:  •  TSH, 3rd generation; Future  •  T4, free; Future  •  Comprehensive metabolic panel; Future

## 2025-01-13 NOTE — PATIENT INSTRUCTIONS
Here for adhd and doing well except palpitations and will rec lower dose of adderall xr 15 mg daily and will call if palpitations not better. Take cholesterol med as directed. Decreases caffeine and any decongestants and also call if worse. Get sleep. EKG and labs started. Patient in [employer]. EKG stable and no ST-T changes. Call if any problems. Consider cardiology consult and holter if not better. Patient to call in 1 month with update.

## 2025-01-15 ENCOUNTER — TELEPHONE (OUTPATIENT)
Age: 35
End: 2025-01-15

## 2025-01-15 NOTE — TELEPHONE ENCOUNTER
New Patient    What is the reason for the patient’s appointment?:pt called and scheduled an appt for VAS consult on 2/19 at 3:00    What office location does the patient prefer?: [facility]    Does patient have Imaging/Lab Results:    Have patient records been requested?:  If No, are the records showing in Epic:       INSURANCE:   Do we accept the patient's insurance or is the patient Self-Pay?:    Insurance Provider: [employer]   Plan Type/Number:   Member ID#:       HISTORY:   Has the patient had any previous Urologist(s)?: No    Was the patient seen in the ED?: No    Has the patient had any outside testing done?: No    Does the patient have a personal history of cancer?: No

## 2025-02-19 ENCOUNTER — OFFICE VISIT (OUTPATIENT)
Dept: UROLOGY | Facility: MEDICAL CENTER | Age: 35
End: 2025-02-19
Payer: COMMERCIAL

## 2025-02-19 ENCOUNTER — TELEPHONE (OUTPATIENT)
Dept: UROLOGY | Facility: CLINIC | Age: 35
End: 2025-02-19

## 2025-02-19 VITALS
BODY MASS INDEX: 31.4 KG/M2 | HEIGHT: 69 IN | HEART RATE: 111 BPM | OXYGEN SATURATION: 97 % | DIASTOLIC BLOOD PRESSURE: 80 MMHG | WEIGHT: 212 LBS | SYSTOLIC BLOOD PRESSURE: 114 MMHG

## 2025-02-19 DIAGNOSIS — Z30.09 STERILIZATION CONSULT: Primary | ICD-10-CM

## 2025-02-19 PROCEDURE — 99203 OFFICE O/P NEW LOW 30 MIN: CPT | Performed by: UROLOGY

## 2025-02-19 RX ORDER — ALPRAZOLAM 1 MG/1
TABLET ORAL
Qty: 1 TABLET | Refills: 0 | Status: SHIPPED | OUTPATIENT
Start: 2025-02-19

## 2025-02-19 NOTE — TELEPHONE ENCOUNTER
EZ 02/19/25 to inform patient of the cancellation of his 3 pm appointment with Ling on 02/19/25 to please call the office to reschedule at [phone]

## 2025-02-19 NOTE — PROGRESS NOTES
"   HISTORY:        This patient has 2 children and would like to have a vasectomy.  He and his wife or partner are sure they want no more children, and are aware that vasectomy is intended to be a permanent form of sterilization.    He has been told and understands the following:         We will order a semen analysis to check for sterility after two months, and that he must use protection during that time.         No guarantee can be made of permanent sterility, and that failure of the procedure is not common, but can occur.  Furthermore, spontaneous reestablishment of the flow sperm is quite rare but is a possible reason for failure of the procedure.           Although it is not mandatory, if he wants to request another semen sample at any time in the future, to ensure continued sterility, he can do so, at his decision.         Potential complications of the procedure include, but are not limited to:  Excessive bleeding which can cause significant swelling; infections; chronic lingering pain of the testicle; damage to the blood supply of the testicle, which might lead to testicular atrophy.         The patient has told me he understands the above statements, and wishes to proceed with scheduling the vasectomy.        Review of Systems      Objective:     Physical Exam  Genitourinary:     Comments: Penis testes normal  Vasa easily palpable          No results found for: \"PSA\"]  BUN   Date Value Ref Range Status   06/19/2024 10 5 - 25 mg/dL Final   03/29/2019 11 6 - 20 mg/dL Final     Creatinine   Date Value Ref Range Status   06/19/2024 0.87 0.60 - 1.30 mg/dL Final     Comment:     Standardized to IDMS reference method   03/29/2019 1.0 0.6 - 1.2 mg/dL Final     Comment:     GFR should be used to assess renal function.  Plasma/Serum creatinine may not   be able to properly reflect renal function in some cases.     No components found for: \"CBC\"    Patient Active Problem List   Diagnosis    Bee allergy status    BMI " 27.0-27.9,adult    Hyperlipidemia    Toenail fungus    Annual physical exam    Attention deficit disorder (ADD) without hyperactivity    Arthralgia of knee        Patient ID: Carlos Garcia is a 34 y.o. male.      Current Outpatient Medications:     ALPRAZolam (XANAX) 1 mg tablet, 1-2 hr before procedure, Disp: 1 tablet, Rfl: 0    amphetamine-dextroamphetamine (ADDERALL XR, 15MG,) 15 MG 24 hr capsule, Take 1 capsule (15 mg total) by mouth every morning Max Daily Amount: 15 mg, Disp: 30 capsule, Rfl: 0    ezetimibe (ZETIA) 10 mg tablet, TAKE 1 TABLET BY MOUTH EVERY DAY, Disp: 100 tablet, Rfl: 1    EPINEPHrine (EpiPen 2-Leighton) 0.3 mg/0.3 mL SOAJ, Inject 0.3 mL (0.3 mg total) into a muscle once for 1 dose, Disp: 0.6 mL, Rfl: 0

## 2025-04-18 ENCOUNTER — PROCEDURE VISIT (OUTPATIENT)
Dept: UROLOGY | Facility: MEDICAL CENTER | Age: 35
End: 2025-04-18
Payer: COMMERCIAL

## 2025-04-18 VITALS
SYSTOLIC BLOOD PRESSURE: 104 MMHG | DIASTOLIC BLOOD PRESSURE: 64 MMHG | HEART RATE: 101 BPM | OXYGEN SATURATION: 98 % | BODY MASS INDEX: 28.88 KG/M2 | WEIGHT: 195 LBS | RESPIRATION RATE: 16 BRPM | HEIGHT: 69 IN

## 2025-04-18 DIAGNOSIS — Z30.2 ENCOUNTER FOR STERILIZATION: Primary | ICD-10-CM

## 2025-04-18 PROCEDURE — 88302 TISSUE EXAM BY PATHOLOGIST: CPT | Performed by: STUDENT IN AN ORGANIZED HEALTH CARE EDUCATION/TRAINING PROGRAM

## 2025-04-18 PROCEDURE — 55250 REMOVAL OF SPERM DUCT(S): CPT | Performed by: UROLOGY

## 2025-04-22 PROCEDURE — 88302 TISSUE EXAM BY PATHOLOGIST: CPT | Performed by: STUDENT IN AN ORGANIZED HEALTH CARE EDUCATION/TRAINING PROGRAM

## 2025-06-10 DIAGNOSIS — E78.00 PURE HYPERCHOLESTEROLEMIA: Primary | ICD-10-CM

## 2025-06-27 ENCOUNTER — APPOINTMENT (OUTPATIENT)
Dept: LAB | Facility: HOSPITAL | Age: 35
End: 2025-06-27
Attending: UROLOGY
Payer: COMMERCIAL

## 2025-06-27 DIAGNOSIS — Z30.2 ENCOUNTER FOR STERILIZATION: ICD-10-CM

## 2025-06-27 LAB
DEPRECATED CD4 CELLS/CD8 CELLS BLD: 2.75 ML
SPERM MOTILE SMN QL MICRO: NORMAL

## 2025-06-27 PROCEDURE — 89321 SEMEN ANAL SPERM DETECTION: CPT

## 2025-07-17 ENCOUNTER — APPOINTMENT (OUTPATIENT)
Dept: LAB | Facility: HOSPITAL | Age: 35
End: 2025-07-17
Payer: COMMERCIAL

## 2025-07-17 ENCOUNTER — OFFICE VISIT (OUTPATIENT)
Dept: FAMILY MEDICINE CLINIC | Facility: CLINIC | Age: 35
End: 2025-07-17
Payer: COMMERCIAL

## 2025-07-17 VITALS
OXYGEN SATURATION: 97 % | HEART RATE: 90 BPM | BODY MASS INDEX: 31.1 KG/M2 | DIASTOLIC BLOOD PRESSURE: 80 MMHG | WEIGHT: 210 LBS | SYSTOLIC BLOOD PRESSURE: 130 MMHG | RESPIRATION RATE: 16 BRPM | TEMPERATURE: 97.6 F | HEIGHT: 69 IN

## 2025-07-17 DIAGNOSIS — L98.9 LESION OF SKIN OF WRIST: ICD-10-CM

## 2025-07-17 DIAGNOSIS — E78.00 PURE HYPERCHOLESTEROLEMIA: Primary | ICD-10-CM

## 2025-07-17 DIAGNOSIS — R00.2 PALPITATIONS: ICD-10-CM

## 2025-07-17 DIAGNOSIS — B35.1 TOENAIL FUNGUS: ICD-10-CM

## 2025-07-17 DIAGNOSIS — E66.9 OBESITY (BMI 30-39.9): ICD-10-CM

## 2025-07-17 DIAGNOSIS — E78.00 PURE HYPERCHOLESTEROLEMIA: ICD-10-CM

## 2025-07-17 DIAGNOSIS — F98.8 ATTENTION DEFICIT DISORDER (ADD) WITHOUT HYPERACTIVITY: ICD-10-CM

## 2025-07-17 PROBLEM — E78.5 HYPERLIPIDEMIA: Status: RESOLVED | Noted: 2020-09-10 | Resolved: 2025-07-17

## 2025-07-17 LAB
ALBUMIN SERPL BCG-MCNC: 4.4 G/DL (ref 3.5–5)
ALP SERPL-CCNC: 57 U/L (ref 34–104)
ALT SERPL W P-5'-P-CCNC: 74 U/L (ref 7–52)
ANION GAP SERPL CALCULATED.3IONS-SCNC: 7 MMOL/L (ref 4–13)
AST SERPL W P-5'-P-CCNC: 33 U/L (ref 13–39)
BILIRUB SERPL-MCNC: 1.07 MG/DL (ref 0.2–1)
BUN SERPL-MCNC: 13 MG/DL (ref 5–25)
CALCIUM SERPL-MCNC: 9.3 MG/DL (ref 8.4–10.2)
CHLORIDE SERPL-SCNC: 103 MMOL/L (ref 96–108)
CHOLEST SERPL-MCNC: 340 MG/DL (ref ?–200)
CO2 SERPL-SCNC: 29 MMOL/L (ref 21–32)
CREAT SERPL-MCNC: 0.87 MG/DL (ref 0.6–1.3)
GFR SERPL CREATININE-BSD FRML MDRD: 110 ML/MIN/1.73SQ M
GLUCOSE P FAST SERPL-MCNC: 94 MG/DL (ref 65–99)
HDLC SERPL-MCNC: 71 MG/DL
LDLC SERPL CALC-MCNC: 240 MG/DL (ref 0–100)
LDLC SERPL DIRECT ASSAY-MCNC: 277 MG/DL (ref 0–100)
POTASSIUM SERPL-SCNC: 4 MMOL/L (ref 3.5–5.3)
PROT SERPL-MCNC: 8.1 G/DL (ref 6.4–8.4)
SODIUM SERPL-SCNC: 139 MMOL/L (ref 135–147)
T4 FREE SERPL-MCNC: 0.92 NG/DL (ref 0.61–1.12)
TRIGL SERPL-MCNC: 146 MG/DL (ref ?–150)
TSH SERPL DL<=0.05 MIU/L-ACNC: 2.06 UIU/ML (ref 0.45–4.5)

## 2025-07-17 PROCEDURE — 84439 ASSAY OF FREE THYROXINE: CPT

## 2025-07-17 PROCEDURE — 36415 COLL VENOUS BLD VENIPUNCTURE: CPT

## 2025-07-17 PROCEDURE — 80061 LIPID PANEL: CPT

## 2025-07-17 PROCEDURE — 99214 OFFICE O/P EST MOD 30 MIN: CPT | Performed by: FAMILY MEDICINE

## 2025-07-17 PROCEDURE — 80053 COMPREHEN METABOLIC PANEL: CPT

## 2025-07-17 PROCEDURE — 83721 ASSAY OF BLOOD LIPOPROTEIN: CPT

## 2025-07-17 PROCEDURE — 84443 ASSAY THYROID STIM HORMONE: CPT

## 2025-07-17 RX ORDER — PRAVASTATIN SODIUM 20 MG
20 TABLET ORAL
Qty: 100 TABLET | Refills: 3 | Status: SHIPPED | OUTPATIENT
Start: 2025-07-17

## 2025-07-17 NOTE — PROGRESS NOTES
"Adult Annual Physical  Name: Carlos Garcia      : 1990      MRN: [id number]  Encounter Provider: Clay La DO  Encounter Date: 2025   Encounter department: [facility] PRIMARY CARE  Chief Complaint   Patient presents with   • Well Check   • Follow-up     6 month follow up      Patient Instructions   See Cardiology as directed for high ldl cholesterol, restart pravastatin 20 mg daily and zetia. Call if mm aches and cramps. Consider other treatment options. Patient off adhd meds and stable. Lose weight as directed to get BMI lower than 25. Use sunscreen and monitor for ticks. Avoid processed foods and rec strength training and get at least 8 hours sleep per night. Derm consult left wrist skin lesion growing and for toenail fungus.   Patient Education     Routine physical for adults   The Basics   Written by the doctors and editors at UpToDate   What is a physical? -- A physical is a routine visit, or \"check-up,\" with your doctor. You might also hear it called a \"wellness visit\" or \"preventive visit.\"  During each visit, the doctor will:   Ask about your physical and mental health   Ask about your habits, behaviors, and lifestyle   Do an exam   Give you vaccines if needed   Talk to you about any medicines you take   Give advice about your health   Answer your questions  Getting regular check-ups is an important part of taking care of your health. It can help your doctor find and treat any problems you have. But it's also important for preventing health problems.  A routine physical is different from a \"sick visit.\" A sick visit is when you see a doctor because of a health concern or problem. Since physicals are scheduled ahead of time, you can think about what you want to ask the doctor.  How often should I get a physical? -- It depends on your age and health. In general, for people age 21 years and older:   If you are younger than 50 years, you might be able to get a physical every 3 " "years.   If you are 50 years or older, your doctor might recommend a physical every year.  If you have an ongoing health condition, like diabetes or high blood pressure, your doctor will probably want to see you more often.  What happens during a physical? -- In general, each visit will include:   Physical exam - The doctor or nurse will check your height, weight, heart rate, and blood pressure. They will also look at your eyes and ears. They will ask about how you are feeling and whether you have any symptoms that bother you.   Medicines - It's a good idea to bring a list of all the medicines you take to each doctor visit. Your doctor will talk to you about your medicines and answer any questions. Tell them if you are having any side effects that bother you. You should also tell them if you are having trouble paying for any of your medicines.   Habits and behaviors - This includes:   Your diet   Your exercise habits   Whether you smoke, drink alcohol, or use drugs   Whether you are sexually active   Whether you feel safe at home  Your doctor will talk to you about things you can do to improve your health and lower your risk of health problems. They will also offer help and support. For example, if you want to quit smoking, they can give you advice and might prescribe medicines. If you want to improve your diet or get more physical activity, they can help you with this, too.   Lab tests, if needed - The tests you get will depend on your age and situation. For example, your doctor might want to check your:   Cholesterol   Blood sugar   Iron level   Vaccines - The recommended vaccines will depend on your age, health, and what vaccines you already had. Vaccines are very important because they can prevent certain serious or deadly infections.   Discussion of screening - \"Screening\" means checking for diseases or other health problems before they cause symptoms. Your doctor can recommend screening based on your age, " risk, and preferences. This might include tests to check for:   Cancer, such as breast, prostate, cervical, ovarian, colorectal, prostate, lung, or skin cancer   Sexually transmitted infections, such as chlamydia and gonorrhea   Mental health conditions like depression and anxiety  Your doctor will talk to you about the different types of screening tests. They can help you decide which screenings to have. They can also explain what the results might mean.   Answering questions - The physical is a good time to ask the doctor or nurse questions about your health. If needed, they can refer you to other doctors or specialists, too.  Adults older than 65 years often need other care, too. As you get older, your doctor will talk to you about:   How to prevent falling at home   Hearing or vision tests   Memory testing   How to take your medicines safely   Making sure that you have the help and support you need at home  All topics are updated as new evidence becomes available and our peer review process is complete.  This topic retrieved from [employer] on: May 02, 2024.  Topic 571473 Version 1.0  Release: 32.4.3 - C32.122  © 2024 UpToDate, Inc. and/or its affiliates. All rights reserved.  Consumer Information Use and Disclaimer   Disclaimer: This generalized information is a limited summary of diagnosis, treatment, and/or medication information. It is not meant to be comprehensive and should be used as a tool to help the user understand and/or assess potential diagnostic and treatment options. It does NOT include all information about conditions, treatments, medications, side effects, or risks that may apply to a specific patient. It is not intended to be medical advice or a substitute for the medical advice, diagnosis, or treatment of a health care provider based on the health care provider's examination and assessment of a patient's specific and unique circumstances. Patients must speak with a health care provider for  complete information about their health, medical questions, and treatment options, including any risks or benefits regarding use of medications. This information does not endorse any treatments or medications as safe, effective, or approved for treating a specific patient. UpToDate, Inc. and its affiliates disclaim any warranty or liability relating to this information or the use thereof.The use of this information is governed by the Terms of Use, available at https://www.[employer].com/en/know/clinical-effectiveness-terms. 2024© UpToDate, Inc. and its affiliates and/or licensors. All rights reserved.  Copyright   © 2024 UpToDate, Inc. and/or its affiliates. All rights reserved.       :  Assessment & Plan  Pure hypercholesterolemia  Low cholesterol diet encouraged and rec soluble fiber in diet and also rec seeing cardiology  Orders:  •  pravastatin (PRAVACHOL) 20 mg tablet; Take 1 tablet (20 mg total) by mouth daily at bedtime  •  Comprehensive metabolic panel; Future  •  Lipid Panel with Direct LDL reflex; Future    Attention deficit disorder (ADD) without hyperactivity  Stable off med       Obesity (BMI 30-39.9)  Lose weight as directed to get BMI lower than 25    Orders:  •  Ambulatory Referral to Weight Management; Future    Toenail fungus  Consult derm  Orders:  •  Ambulatory Referral to Dermatology; Future    Lesion of skin of wrist  Consult derm  Orders:  •  Ambulatory Referral to Dermatology; Future        Preventive Screenings:    - Prostate cancer screening: screening not indicated       Depression Screening and Follow-up Plan: Patient was screened for depression during today's encounter. They screened negative with a PHQ-2 score of 0.          History of Present Illness     Adult Annual Physical:  Patient presents for annual physical.     Diet and Physical Activity:  - Diet/Nutrition: no special diet.  - Exercise: walking, moderate cardiovascular exercise, 1-2 times a week on average and 30-60 minutes  "on average.    Depression Screening:  - PHQ-2 Score: 0    General Health:  - Sleep: sleeps well and 7-8 hours of sleep on average.  - Hearing: tinnitus.  - Vision: no vision problems and most recent eye exam > 1 year ago.  - Dental: regular dental visits, brushes teeth once daily and floss regularly.     Health:  - History of STDs: no.   - Urinary symptoms: none.     Advanced Care Planning:  - Has an advanced directive?: no    - Has a durable medical POA?: no      Review of Systems   Constitutional: Negative.    HENT: Negative.     Eyes: Negative.    Respiratory: Negative.     Cardiovascular: Negative.    Gastrointestinal: Negative.    Endocrine: Negative.    Genitourinary: Negative.    Musculoskeletal: Negative.    Skin: Negative.    Allergic/Immunologic: Negative.    Neurological: Negative.    Hematological: Negative.    Psychiatric/Behavioral: Negative.       Medications Ordered Prior to Encounter[1]     Objective   /80   Pulse 90   Temp 97.6 °F (36.4 °C) (Temporal)   Resp 16   Ht 5' 9\" (1.753 m)   Wt 95.3 kg (210 lb)   SpO2 97%   BMI 31.01 kg/m²     Physical Exam  Constitutional:       Appearance: He is well-developed. He is obese.   HENT:      Head: Normocephalic and atraumatic.      Right Ear: External ear normal.      Left Ear: External ear normal.      Nose: Nose normal.      Mouth/Throat:      Mouth: Mucous membranes are moist.     Eyes:      Conjunctiva/sclera: Conjunctivae normal.      Pupils: Pupils are equal, round, and reactive to light.       Cardiovascular:      Rate and Rhythm: Normal rate and regular rhythm.      Pulses: Normal pulses.      Heart sounds: Normal heart sounds.   Pulmonary:      Effort: Pulmonary effort is normal.      Breath sounds: Normal breath sounds.     Musculoskeletal:         General: Normal range of motion.      Cervical back: Normal range of motion and neck supple.     Skin:     General: Skin is warm and dry.      Capillary Refill: Capillary refill takes less " than 2 seconds.     Neurological:      General: No focal deficit present.      Mental Status: He is alert and oriented to person, place, and time. Mental status is at baseline.      Deep Tendon Reflexes: Reflexes are normal and symmetric.     Psychiatric:         Mood and Affect: Mood normal.         Behavior: Behavior normal.         Thought Content: Thought content normal.         Judgment: Judgment normal.       Administrative Statements   I have spent a total time of 35 minutes in caring for this patient on the day of the visit/encounter including Diagnostic results, Prognosis, Risks and benefits of tx options, Instructions for management, Patient and family education, Importance of tx compliance, Risk factor reductions, Impressions, Counseling / Coordination of care, Documenting in the medical record, Reviewing/placing orders in the medical record (including tests, medications, and/or procedures), and Obtaining or reviewing history  .       [1]  Current Outpatient Medications on File Prior to Visit   Medication Sig Dispense Refill   • ezetimibe (ZETIA) 10 mg tablet TAKE 1 TABLET BY MOUTH EVERY  tablet 1   • ALPRAZolam (XANAX) 1 mg tablet 1-2 hr before procedure (Patient not taking: Reported on 7/17/2025) 1 tablet 0   • EPINEPHrine (EpiPen 2-Leighton) 0.3 mg/0.3 mL SOAJ Inject 0.3 mL (0.3 mg total) into a muscle once for 1 dose 0.6 mL 0   • [DISCONTINUED] amphetamine-dextroamphetamine (ADDERALL XR, 15MG,) 15 MG 24 hr capsule Take 1 capsule (15 mg total) by mouth every morning Max Daily Amount: 15 mg (Patient not taking: Reported on 7/17/2025) 30 capsule 0     No current facility-administered medications on file prior to visit.

## 2025-07-17 NOTE — PATIENT INSTRUCTIONS
"See Cardiology as directed for high ldl cholesterol, restart pravastatin 20 mg daily and zetia. Call if mm aches and cramps. Consider other treatment options. Patient off adhd meds and stable. Lose weight as directed to get BMI lower than 25. Use sunscreen and monitor for ticks. Avoid processed foods and rec strength training and get at least 8 hours sleep per night. Derm consult left wrist skin lesion growing and for toenail fungus.   Patient Education     Routine physical for adults   The Basics   Written by the doctors and editors at [facility]   What is a physical? -- A physical is a routine visit, or \"check-up,\" with your doctor. You might also hear it called a \"wellness visit\" or \"preventive visit.\"  During each visit, the doctor will:   Ask about your physical and mental health   Ask about your habits, behaviors, and lifestyle   Do an exam   Give you vaccines if needed   Talk to you about any medicines you take   Give advice about your health   Answer your questions  Getting regular check-ups is an important part of taking care of your health. It can help your doctor find and treat any problems you have. But it's also important for preventing health problems.  A routine physical is different from a \"sick visit.\" A sick visit is when you see a doctor because of a health concern or problem. Since physicals are scheduled ahead of time, you can think about what you want to ask the doctor.  How often should I get a physical? -- It depends on your age and health. In general, for people age 21 years and older:   If you are younger than 50 years, you might be able to get a physical every 3 years.   If you are 50 years or older, your doctor might recommend a physical every year.  If you have an ongoing health condition, like diabetes or high blood pressure, your doctor will probably want to see you more often.  What happens during a physical? -- In general, each visit will include:   Physical exam - The doctor or nurse " "will check your height, weight, heart rate, and blood pressure. They will also look at your eyes and ears. They will ask about how you are feeling and whether you have any symptoms that bother you.   Medicines - It's a good idea to bring a list of all the medicines you take to each doctor visit. Your doctor will talk to you about your medicines and answer any questions. Tell them if you are having any side effects that bother you. You should also tell them if you are having trouble paying for any of your medicines.   Habits and behaviors - This includes:   Your diet   Your exercise habits   Whether you smoke, drink alcohol, or use drugs   Whether you are sexually active   Whether you feel safe at home  Your doctor will talk to you about things you can do to improve your health and lower your risk of health problems. They will also offer help and support. For example, if you want to quit smoking, they can give you advice and might prescribe medicines. If you want to improve your diet or get more physical activity, they can help you with this, too.   Lab tests, if needed - The tests you get will depend on your age and situation. For example, your doctor might want to check your:   Cholesterol   Blood sugar   Iron level   Vaccines - The recommended vaccines will depend on your age, health, and what vaccines you already had. Vaccines are very important because they can prevent certain serious or deadly infections.   Discussion of screening - \"Screening\" means checking for diseases or other health problems before they cause symptoms. Your doctor can recommend screening based on your age, risk, and preferences. This might include tests to check for:   Cancer, such as breast, prostate, cervical, ovarian, colorectal, prostate, lung, or skin cancer   Sexually transmitted infections, such as chlamydia and gonorrhea   Mental health conditions like depression and anxiety  Your doctor will talk to you about the different types " of screening tests. They can help you decide which screenings to have. They can also explain what the results might mean.   Answering questions - The physical is a good time to ask the doctor or nurse questions about your health. If needed, they can refer you to other doctors or specialists, too.  Adults older than 65 years often need other care, too. As you get older, your doctor will talk to you about:   How to prevent falling at home   Hearing or vision tests   Memory testing   How to take your medicines safely   Making sure that you have the help and support you need at home  All topics are updated as new evidence becomes available and our peer review process is complete.  This topic retrieved from [employer] on: May 02, 2024.  Topic 025334 Version 1.0  Release: 32.4.3 - C32.122  © 2024 [employer] and/or its affiliates. All rights reserved.  Consumer Information Use and Disclaimer   Disclaimer: This generalized information is a limited summary of diagnosis, treatment, and/or medication information. It is not meant to be comprehensive and should be used as a tool to help the user understand and/or assess potential diagnostic and treatment options. It does NOT include all information about conditions, treatments, medications, side effects, or risks that may apply to a specific patient. It is not intended to be medical advice or a substitute for the medical advice, diagnosis, or treatment of a health care provider based on the health care provider's examination and assessment of a patient's specific and unique circumstances. Patients must speak with a health care provider for complete information about their health, medical questions, and treatment options, including any risks or benefits regarding use of medications. This information does not endorse any treatments or medications as safe, effective, or approved for treating a specific patient. Up[employer], Inc. and its affiliates disclaim any warranty or liability  relating to this information or the use thereof.The use of this information is governed by the Terms of Use, available at https://www.wolterskluwer.com/en/know/clinical-effectiveness-terms. 2024© Up[employer]Date, Inc. and its affiliates and/or licensors. All rights reserved.  Copyright   © 2024 [employer] and/or its affiliates. All rights reserved.

## 2025-07-17 NOTE — ASSESSMENT & PLAN NOTE
Low cholesterol diet encouraged and rec soluble fiber in diet and also rec seeing cardiology  Orders:  •  pravastatin (PRAVACHOL) 20 mg tablet; Take 1 tablet (20 mg total) by mouth daily at bedtime  •  Comprehensive metabolic panel; Future  •  Lipid Panel with Direct LDL reflex; Future

## 2025-07-25 ENCOUNTER — RESULTS FOLLOW-UP (OUTPATIENT)
Age: 35
End: 2025-07-25

## 2025-07-25 DIAGNOSIS — E78.00 PURE HYPERCHOLESTEROLEMIA: Primary | ICD-10-CM

## 2025-07-25 DIAGNOSIS — E78.01 FAMILIAL HYPERCHOLESTEROLEMIA: ICD-10-CM

## 2025-07-25 LAB
GENE DIS ANL INTERP-IMP: NEGATIVE
GENE DIS ASSESSED: NORMAL
GENES TESTED: NORMAL
INTERPRETATION METHODS AND LIMITATIONS: NORMAL
Lab: NORMAL
SEQUENCING LOCATION: NORMAL

## 2025-07-25 RX ORDER — EZETIMIBE 10 MG/1
10 TABLET ORAL DAILY
Qty: 100 TABLET | Refills: 3 | Status: SHIPPED | OUTPATIENT
Start: 2025-07-25

## 2025-07-25 NOTE — RESULT ENCOUNTER NOTE
Patient has known statin intolerance (myalgia) and is poorly controlled on maximally tolerated dose of pravastatin.   LDL is up significantly.  FH genetic panel pending through [facility].   Will start Repatha 140 mg SC q14d.

## 2025-07-30 ENCOUNTER — TELEPHONE (OUTPATIENT)
Dept: CARDIOLOGY CLINIC | Facility: CLINIC | Age: 35
End: 2025-07-30